# Patient Record
Sex: MALE | Race: WHITE | URBAN - METROPOLITAN AREA
[De-identification: names, ages, dates, MRNs, and addresses within clinical notes are randomized per-mention and may not be internally consistent; named-entity substitution may affect disease eponyms.]

---

## 2017-07-13 ENCOUNTER — TRANSFERRED RECORDS (OUTPATIENT)
Dept: HEALTH INFORMATION MANAGEMENT | Facility: CLINIC | Age: 27
End: 2017-07-13

## 2017-07-26 ENCOUNTER — TRANSFERRED RECORDS (OUTPATIENT)
Dept: HEALTH INFORMATION MANAGEMENT | Facility: CLINIC | Age: 27
End: 2017-07-26

## 2017-08-25 ENCOUNTER — TRANSFERRED RECORDS (OUTPATIENT)
Dept: HEALTH INFORMATION MANAGEMENT | Facility: CLINIC | Age: 27
End: 2017-08-25

## 2017-09-11 ENCOUNTER — CARE COORDINATION (OUTPATIENT)
Dept: GASTROENTEROLOGY | Facility: CLINIC | Age: 27
End: 2017-09-11

## 2017-09-11 NOTE — PROGRESS NOTES
[facility] Intake:     Referring/Requesting provider and Health care System: Irish Garcia [facility] GI     Clinic contact - Name Arnol Phone [phone] and Fax number: [phone]     Procedure Requested: EUS     Requested provided (if specified): Next available     Is this a procedure the provider does? (Look at procedure list):  Yes     Has patient been evaluated in clinic or had a procedure [facility] provider in the last 5 years:  No     What is the procedure to evaluate for/Reason/Indication of procedure: Duodenal mass vs abscess: wants 2nd opinion.     Is procedure to rule out malignancy or is there concern for underlying malignancy (if yes, send messages as High priority): Unsure     History and physical within last 30 days?  Yes     Imaging completed:     CT scan      Yes   MRI        No     Procedures:     Upper Endoscopy/EGD: Yes     Endoscopic Ultrasound/EUS: Yes -Upper EUS     ERCP X No     Colonoscopy X No     Are images able/being pushed to our system?  Yes       Was clinic informed to FEDEX/UPS Urgent referral imaging overnight?(requested within 1 week)   No     Is patient is aware of request for procedure and ok to be contacted to schedule?  Yes     Referral Received: 9/11/2017 9/11/2017: Film called to grab images, was told they do not have any images. Called Referring office: message left for Thad and advised we have not received images- pushed and we do not have any hard copy records either.   9/15/2017: received 16 pages of hard copy records including a cover page. Called referring provider to let them know we still do not have CT images, left voicemail advising need images.   9/25/20174: received CT images in PACS   Hard Copy chart created/ Records received: 9/15/2017    MD review date:                               Clinical History (per RN review of records provided):   GI Note from Irish Garcia NP 8/25/2017:  27 yr old with duodenal mass and recent EGD. Complaints of  epigastric discomfort and heartburn on occasion but not daily. Not on PPI due to the abscess that was present previously. Denies dysphagia, hematemesis, bowel changes, hematochezia or melena.     CT 7/13/2017  Impression:  - No evidence of appendicitis or diverticulitis.   - Wall thickening of the duodenum is however present and duodenitis cannot be ruled out.     EGD 7/26/2017  Assessment:  Ulcerated mass lesion in the bulb of duodenum, tunneled biopsies take for culture, regular histology, requested congo red strain and PAS stain; normal distal duodenum. Normal antrum, body of stomach, cardia and fundus. Noted barotrauma in fundus. The GE junction was visualized. Normal entire esophagus.       Recommendations/Orders:    9/28/2017: Hard copy records reviewed by Dr. Jernigan:  1. Clinic   Scheduled for Oct. 5th at 0900. Address given and phone number to call to reschedule or call to discuss with this RN.     Natalya ACOSTA RN Coordinator  Dr. Le, Dr. Cruz & Dr. Jernigan   [facility]  [phone]

## 2017-11-08 ENCOUNTER — TELEPHONE (OUTPATIENT)
Dept: GASTROENTEROLOGY | Facility: CLINIC | Age: 27
End: 2017-11-08

## 2017-11-08 NOTE — TELEPHONE ENCOUNTER
Spoke with patient reminded of upcoming appointment with Dr. Vincent on 11/16.  Number provided if needs to reschedule. He plans to attend.

## 2017-11-14 ASSESSMENT — ENCOUNTER SYMPTOMS
BOWEL INCONTINENCE: 0
ABDOMINAL PAIN: 1
VOMITING: 0
NAUSEA: 1
BLOOD IN STOOL: 0
CONSTIPATION: 0
RECTAL PAIN: 0
DIARRHEA: 0
JAUNDICE: 0
BLOATING: 1
HEARTBURN: 1

## 2017-11-16 ENCOUNTER — OFFICE VISIT (OUTPATIENT)
Dept: GASTROENTEROLOGY | Facility: CLINIC | Age: 27
End: 2017-11-16

## 2017-11-16 ENCOUNTER — OFFICE VISIT (OUTPATIENT)
Dept: SURGERY | Facility: CLINIC | Age: 27
End: 2017-11-16

## 2017-11-16 ENCOUNTER — ALLIED HEALTH/NURSE VISIT (OUTPATIENT)
Dept: SURGERY | Facility: CLINIC | Age: 27
End: 2017-11-16

## 2017-11-16 ENCOUNTER — ANESTHESIA EVENT (OUTPATIENT)
Dept: SURGERY | Facility: CLINIC | Age: 27
End: 2017-11-16

## 2017-11-16 VITALS
HEIGHT: 68 IN | OXYGEN SATURATION: 95 % | BODY MASS INDEX: 23.95 KG/M2 | TEMPERATURE: 97.6 F | SYSTOLIC BLOOD PRESSURE: 122 MMHG | WEIGHT: 158 LBS | DIASTOLIC BLOOD PRESSURE: 77 MMHG | HEART RATE: 64 BPM

## 2017-11-16 VITALS
BODY MASS INDEX: 23.96 KG/M2 | DIASTOLIC BLOOD PRESSURE: 77 MMHG | WEIGHT: 158.07 LBS | SYSTOLIC BLOOD PRESSURE: 122 MMHG | OXYGEN SATURATION: 95 % | TEMPERATURE: 97.6 F | RESPIRATION RATE: 18 BRPM | HEIGHT: 68 IN | HEART RATE: 64 BPM

## 2017-11-16 DIAGNOSIS — K31.89 DUODENAL MASS: Primary | ICD-10-CM

## 2017-11-16 DIAGNOSIS — K31.89 DUODENAL MASS: ICD-10-CM

## 2017-11-16 LAB
ALBUMIN SERPL-MCNC: 4.3 G/DL (ref 3.4–5)
ALP SERPL-CCNC: 43 U/L (ref 40–150)
ALT SERPL W P-5'-P-CCNC: 16 U/L (ref 0–70)
ANION GAP SERPL CALCULATED.3IONS-SCNC: 7 MMOL/L (ref 3–14)
AST SERPL W P-5'-P-CCNC: 17 U/L (ref 0–45)
BASOPHILS # BLD AUTO: 0 10E9/L (ref 0–0.2)
BASOPHILS NFR BLD AUTO: 0.5 %
BILIRUB SERPL-MCNC: 1.2 MG/DL (ref 0.2–1.3)
BUN SERPL-MCNC: 10 MG/DL (ref 7–30)
CALCIUM SERPL-MCNC: 9.2 MG/DL (ref 8.5–10.1)
CHLORIDE SERPL-SCNC: 104 MMOL/L (ref 94–109)
CO2 SERPL-SCNC: 29 MMOL/L (ref 20–32)
CREAT SERPL-MCNC: 0.74 MG/DL (ref 0.66–1.25)
DIFFERENTIAL METHOD BLD: ABNORMAL
EOSINOPHIL # BLD AUTO: 0.1 10E9/L (ref 0–0.7)
EOSINOPHIL NFR BLD AUTO: 3.7 %
ERYTHROCYTE [DISTWIDTH] IN BLOOD BY AUTOMATED COUNT: 11.8 % (ref 10–15)
GFR SERPL CREATININE-BSD FRML MDRD: >90 ML/MIN/1.7M2
GLUCOSE SERPL-MCNC: 90 MG/DL (ref 70–99)
HCT VFR BLD AUTO: 41 % (ref 40–53)
HGB BLD-MCNC: 13.9 G/DL (ref 13.3–17.7)
IMM GRANULOCYTES # BLD: 0 10E9/L (ref 0–0.4)
IMM GRANULOCYTES NFR BLD: 0 %
INR PPP: 0.99 (ref 0.86–1.14)
LYMPHOCYTES # BLD AUTO: 1.3 10E9/L (ref 0.8–5.3)
LYMPHOCYTES NFR BLD AUTO: 34.7 %
MCH RBC QN AUTO: 29.9 PG (ref 26.5–33)
MCHC RBC AUTO-ENTMCNC: 33.9 G/DL (ref 31.5–36.5)
MCV RBC AUTO: 88 FL (ref 78–100)
MONOCYTES # BLD AUTO: 0.4 10E9/L (ref 0–1.3)
MONOCYTES NFR BLD AUTO: 10.1 %
NEUTROPHILS # BLD AUTO: 1.9 10E9/L (ref 1.6–8.3)
NEUTROPHILS NFR BLD AUTO: 51 %
NRBC # BLD AUTO: 0 10*3/UL
NRBC BLD AUTO-RTO: 0 /100
PLATELET # BLD AUTO: 175 10E9/L (ref 150–450)
POTASSIUM SERPL-SCNC: 4.1 MMOL/L (ref 3.4–5.3)
PROT SERPL-MCNC: 7.7 G/DL (ref 6.8–8.8)
RBC # BLD AUTO: 4.65 10E12/L (ref 4.4–5.9)
SODIUM SERPL-SCNC: 140 MMOL/L (ref 133–144)
WBC # BLD AUTO: 3.8 10E9/L (ref 4–11)

## 2017-11-16 ASSESSMENT — PAIN SCALES - GENERAL: PAINLEVEL: NO PAIN (0)

## 2017-11-16 NOTE — H&P
"  Pre-Operative H & P     CC:  Preoperative exam to assess for increased cardiopulmonary risk while undergoing surgery and anesthesia.    Date of Encounter: 11/16/2017  Primary Care Physician:  No Ref-Primary, Physician    HPI  Gordo Bearden is a 27 year old male who presents for pre-operative H & P in preparation for EGD & EUS with Dr. Jernigan on 12/12/2017 at [facility].     Gordo is a 27yr male here for a second opinion in regards to his duodenal mass and recent repeat EGD. He reports for the past several years daily feeling \"bloated\" at various levels of intensity. Approximately once yearly he has significant symptoms of nauseas and vomiting with abdominal pain that can last several days. He manages some of his symptoms with diet. The mass biopsy results: prominent eosinophils; negative for dysplasia or malignancy. The patient was placed  antibiotics for treatment for which the mass became smaller in size however, once he stopped antibiotics it returned back to the previous size. He has met with Dr. Jernigan and the above procedure has been scheduled.  Otherwise oGrdo is a health young male with no other significant health history.       History is obtained from the patient.       Past Medical History  Past Medical History:   Diagnosis Date     Duodenal mass 2015     Duodenal ulcer 2015       Past Surgical History  Past Surgical History:   Procedure Laterality Date     UPPER GI ENDOSCOPY         Hx of Blood transfusions/reactions: no     Hx of abnormal bleeding or anti-platelet use: no    Menstrual history: No LMP for male patient.:     Steroid use in the last year: no    Personal or FH with difficulty with Anesthesia:  no    Prior to Admission Medications  No current outpatient prescriptions on file.       Allergies  Allergies   Allergen Reactions     Bees        Social History  Social History     Social History     Marital status:      Spouse name: N/A " "    Number of children: N/A     Years of education: N/A     Occupational History     Not on file.     Social History Main Topics     Smoking status: Never Smoker     Smokeless tobacco: Never Used     Alcohol use Yes      Comment: casual     Drug use: No     Sexual activity: Not on file     Other Topics Concern     Not on file     Social History Narrative       Family History  Family History   Problem Relation Age of Onset     Hypertension Mother      Leukemia Brother 3     HEART DISEASE Maternal Grandfather        ROS/MED HX    ENT/Pulmonary:  - neg pulmonary ROS     Neurologic:  - neg neurologic ROS     Cardiovascular:  - neg cardiovascular ROS   (+) ----. : . . . :. . No previous cardiac testing       METS/Exercise Tolerance:  >4 METS   Hematologic:  - neg hematologic  ROS       Musculoskeletal:  - neg musculoskeletal ROS       GI/Hepatic: Comment: Controls with diet    (+) GERD Other, Other GI/Hepatic Duodenal mass      Renal/Genitourinary:  - ROS Renal section negative       Endo:  - neg endo ROS       Psychiatric:  - neg psychiatric ROS       Infectious Disease:  - neg infectious disease ROS       Malignancy:      - no malignancy   Other:         Temp: 97.6  F (36.4  C) Temp src: Oral BP: 122/77 Pulse: 64   Resp: 18 SpO2: 95 %         158 lbs 1.12 oz  5' 8.25\"   Body mass index is 23.86 kg/(m^2).       Physical Exam  Constitutional: Awake, alert, cooperative, no apparent distress, and appears stated age.  Eyes: Pupils equal, round and reactive to light, extra ocular muscles intact, sclera clear, conjunctiva normal.  HENT: Normocephalic, oral pharynx with moist mucus membranes, good dentition. No goiter appreciated.   Respiratory: Clear to auscultation bilaterally, no crackles or wheezing.  Cardiovascular: Regular rate and rhythm, normal S1 and S2, and no murmur noted.  Carotids +2, no bruits. No edema. Palpable pulses to radial  DP and PT arteries.   GI: Normal bowel sounds, soft, non-distended, non-tender, no " masses palpated, no hepatosplenomegaly.    Lymph/Hematologic: No cervical lymphadenopathy and no supraclavicular lymphadenopathy.  Genitourinary:  deferred  Skin: Warm and dry.  No rashes at anticipated surgical site.   Musculoskeletal: Full ROM of neck. There is no redness, warmth, or swelling of the joints. Gross motor strength is normal.    Neurologic: Awake, alert, oriented to name, place and time. Cranial nerves II-XII are grossly intact. Gait is normal.   Neuropsychiatric: Calm, cooperative. Normal affect.     EGD: 7/26/17  Findings: Ulcerated mass lesion in bulb of duodenum.    Outside records reviewed from: [facility] everywhere    ASSESSMENT and PLAN  Gordo Bearden is scheduled for EGD and EUS on 12/12/2017 by Dr. Jernigan in management of duodenal mass. PAC referral for risk assessment and optimization for anesthesia with comorbid conditions of :  Pre-operative considerations:  1. Cardiac: Functional status- METS >4.  Low risk surgery with 0.4% (RCRI #) risk of major adverse cardiac event. Patient has no cardiac history or reported symptoms  2. Pulm: Airway feasible. LILY risk: low. No pulmonary history reported  3. GI: Risk of PONV score = 1. If > 2, anti-emetic intervention recommended.      Duodenal mass:   4. Musc: VTE risk:0.5%    Patient was discussed with Dr Olea.    EUSEBIO Lo CNP  Preoperative Assessment Center  [facility]  Clinic and Surgery Center  Phone: [phone]  Fax: [phone]

## 2017-11-16 NOTE — PROGRESS NOTES
REFERRING PHYSICIAN:  Izaiah Stover MD, from [place], [place].      CHIEF COMPLAINT:  Abdominal discomfort.      HISTORY OF PRESENT ILLNESS:    This is a very pleasant 27-year-old white male from [place] who is accompanied by his wife who is being evaluated for nausea, vomiting and abdominal pain.  The patient reports his history started in 07/2015.  He went to his primary care doctor with nausea, vomiting and significant epigastric abdominal pain which he was thought to have gastritis or ulcers and started Zantac and Prilosec.  His symptoms partially resolved, and he had stopped taking Prilosec and Zantac at that point.  His symptoms, however, recurred the following year, 07/2016, and he ended up going back to [facility].  At this point an upper endoscopy and a CT scan were performed.  Upper endoscopy was performed in 11/03/2016.  It showed that there was duodenitis with raised area secondary to swelling from inflammation in the bulb.  This was biopsied, and he was discharged but the symptoms did not improve.  He underwent a CT scan.  The CT scan showed a hypoenhancing oval-shaped lesion within the proximal duodenum just distal to the gastric pylorus which arose in the submucosal space.  At that point, it was thought to be either a neoplastic process versus an abscess, and an upper endoscopy was again repeated.  Nodular erythematous mucosa in the duodenal bulb was again noticed, and he also subsequently underwent an endoscopic ultrasound.  The endoscopic ultrasound showed a poorly defined hypoechoic cystic mass involving the submucosa of the duodenal bulb and prepyloric antrum measuring 2.5 x 1.7 cm.  The cystic portion appeared to have several cysts channels, the largest measuring 15.9 x 5.5 mm.  A 22-gauge Sharkcore needle was used for fine needle biopsy, and tunnel forceps biopsy was performed.  The pathology from the fine needle aspiration showed only inflammatory cells, and it was  thought to be more like an inflammatory versus abscess process.  He was started on antibiotics such as ciprofloxacin and was discharged.  Again, the patient felt better briefly, but his symptoms started again this year.  He underwent another CAT scan in January which showed focal mural thickening of the gastric antrum and the proximal duodenum, and he underwent a CT scan in July which again shows a focal wall thickening of the duodenum.  He subsequently underwent an upper endoscopy in 07/2017 which shows an ulcerated mass in the bulb of the duodenum.  Tunnel biopsies were taken.  This time he was tested for amyloidosis and Whipple disease and was noted to be negative for both.  He was subsequently referred here.  The patient has been doing progressively better.  He has mild abdominal discomfort, but no pain, nausea or vomiting currently.  He has regular bowel movements, 1 bowel movement a day.  He has no melena, hematochezia or hematemesis.  He denies dysphagia or odynophagia.  Denies fevers or chills, has not lost weight.  He did lose weight the last year, about 10-15 pounds, and has gained this weight back.      ALLERGIES:  He has allergies to bees.      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      PAST SURGICAL HISTORY:  EGDs and EUS.      SOCIAL HISTORY:  He is  for the past 5 years, has a 4-year-old kid.  He denies smoking.  He drinks socially, about 2 drinks per day.  Denies IV drug abuse or any street drugs.  Works at office job.      MEDICATIONS:  None currently.      FAMILY HISTORY:  No history of stomach cancer or duodenal cancer.  No history of pancreatic or biliary cancers.      PHYSICAL EXAMINATION:   VITAL SIGNS:  Stable.   GENERAL:  He is not in acute distress.   HEAD AND NECK:  No pallor, no icterus.   CHEST:  Lungs are clear to auscultation.   CARDIOVASCULAR:  S1, S2 heard, rate and rhythm are regular.   ABDOMEN:  Soft, nontender, nondistended.  Bowel sounds are heard.    CENTRAL NERVOUS SYSTEM:  Alert, awake, oriented to time, place and person.   EXTREMITIES:  No pedal edema.      ASSESSMENT AND PLAN:  This is a 27-year-old gentleman who has an intriguing constellation of symptoms including nausea, vomiting and abdominal pain every July or October every year, and he is status post 3 EGDs with the most recent EGD showing an ulcerated nodular mass in the duodenum, status post EUS which showed a 2.5 cm cystic lesion, status post FNB which showed inflammatory cells and Whipplw disease and amyloidosis has already been excluded.  Currently, he is minimally symptomatic.  Differentials include benign disease process such as an ulcers versus gastric ulcerated GIST.        Our recommendations are as follows:     1.  We will repeat CT scan of the abdomen with IV contrast today to evaluate the duodenal wall thickening.   2.  We will obtain preop clearance for endoscopic ultrasound.   3.  We will repeat an upper endoscopy and endoscopic ultrasound.  This has been tentatively scheduled on 12/12/2017, as patient is going to come to [place] for a vacation during that time.  During this process, we will repeat biopsies of the duodenal lesion and we will also an endoscopic ultrasound with fine needle biopsy to evaluate for GIST or degenerated leiomyosarcoma and others per pathology.     4.  We also explained to the patient that his ulcer could have been completely healed by now; but regardless, since the symptoms have been recurrent, we will proceed with this upper endoscopy and endoscopic ultrasound.  We also explained to the patient that there may be another unlikely causes such as Zollinger-Bobby syndrome which could produce gastrin and cause bad, nonhealing ulcers, and during the EUS, we would be looking for small tumors in the pancreas as well.      A total of 45 minutes were spent with more than 50% of the time in counseling and formulating a plan.  The patient and his wife verbalized  their understanding including the risk of endoscopic ultrasound under MAC anesthesia.  No further clinical followup until the procedure.

## 2017-11-16 NOTE — NURSING NOTE
Reviewed today's consult and answered patient's questions.  Patient verbalized understanding and agreed to call me with any questions / concerns in the future and confirmed having our contact information.  Please see patient instructions below. \

## 2017-11-16 NOTE — MR AVS SNAPSHOT
After Visit Summary   2017    Gordo Bearden    MRN: [id number]           Patient Information     Date Of Birth          1990        Visit Information        Provider Department      2017 11:30 AM Rn, [facility] Preoperative Assessment Center        Care Instructions    Preparing for Your Surgery      Name:  Gordo Bearden   MRN:  [id number]   :  1990   Today's Date:  2017     Arriving for surgery:   PAC CLINIC WILL CONTACT YOU 1-2 DAYS BEFORE PROCEDURE TO REVIEW INSTRUCTIONS  [phone]  Surgery date:    Arrival time:    Please come to:           What can I eat or drink?  -  You may have solid food or milk products until 8 hours prior to your surgery.  -  You may have water, apple juice or 7up/Sprite until 2 hours prior to your surgery.    Which medicines can I take?  -  Do NOT take Ibuprofen for 24 hours before surgery.  -  Please take these medications the day of surgery: none  -  Tylenol is OK if needed    How do I prepare myself?  -  Take two showers: one the night before surgery; and one the morning of surgery.         Use Scrubcare or Hibiclens to wash from neck down.  You may use your own shampoo and conditioner. No other hair products.   -  Do NOT use lotion, powder, deodorant, or antiperspirant the day of your surgery.  -  Do NOT wear any makeup, fingernail polish or jewelry.  -  Do not bring your own medications to the hospital, except for inhalers and eye drops.  -  Bring your ID and insurance card.    Questions or Concerns:  If you have questions or concerns, please call the  Preoperative Assessment Center, Monday-Friday 7AM-7PM:  [phone]              AFTER YOUR SURGERY  Breathing exercises   Breathing exercises help you recover faster. Take deep breaths and let the air out slowly. This will:     Help you wake up after surgery.    Help prevent complications like pneumonia.  Preventing complications will help you go home sooner.   We may give you a  breathing device (incentive spirometer) to encourage you to breathe deeply.   Nausea and vomiting   You may feel sick to your stomach after surgery; if so, let your nurse know.    Pain control:  After surgery, you may have pain. Our goal is to help you manage your pain. Pain medicine will help you feel comfortable enough to do activities that will help you heal.  These activities may include breathing exercises, walking and physical therapy.   To help your health care team treat your pain we will ask: 1) If you have pain  2) where it is located 3) describe your pain in your words  Methods of pain control include medications given by mouth, vein or by nerve block for some surgeries.  We may give you a pain control pump that will:  1) Deliver the medicine through a tube placed in your vein  2) Control the amount of medicine you receive  3) Allow you to push a button to deliver a dose of pain medicine  Sequential Compression Device (SCD) or Pneumo Boots:  You may need to wear SCD S on your legs or feet. These are wraps connected to a machine that pumps in air and releases it. The repeated pumping helps prevent blood clots from forming.           Follow-ups after your visit        Your next 10 appointments already scheduled     Nov 16, 2017 11:30 AM CST   (Arrive by 11:15 AM)   PAC RN ASSESSMENT with  Pac Rn   [facility] Preoperative Assessment Center ([facility])    [address] [place] [id number]   [phone]            Nov 16, 2017 11:50 AM CST   (Arrive by 11:35 AM)   PAC Anesthesia Consult with  Pac Anesthesiologist   [facility] Preoperative Assessment Center ([facility])    [address] [place] [id number]   [phone]            Nov 17, 2017  7:00 AM CST   (Arrive by 6:45 AM)   CT ABDOMEN PELVIS W CONTRAST with UCCT1   [facility] [place] [facility] ([facility])    [address] [place] [address]  Se  1st Floor  [facility] [id number] [phone]           Please bring any scans or X-rays taken at other hospitals, if similar tests were done. Also bring a list of your medicines, including vitamins, minerals and over-the-counter drugs. It is safest to leave personal items at home.  Be sure to tell your doctor:   If you have any allergies.   If there s any chance you are pregnant.   If you are breastfeeding.   If you have any special needs.  You may have contrast for this exam. To prepare:   Do not eat or drink for 2 hours before your exam. If you need to take medicine, you may take it with small sips of water. (We may ask you to take liquid medicine as well.)   The day before your exam, drink extra fluids at least six 8-ounce glasses (unless your doctor tells you to restrict your fluids).  Patients over 70 or patients with diabetes or kidney problems:   If you haven t had a blood test (creatinine test) within the last 30 days, go to your clinic or Diagnostic Imaging Department for this test.  If you have diabetes:   If your kidney function is normal, continue taking your metformin (Avandamet, Glucophage, Glucovance, Metaglip) on the day of your exam.   If your kidney function is abnormal, wait 48 hours before restarting this medicine.  You will have oral contrast for this exam:   You will drink the contrast at home. Get this from your clinic or Diagnostic Imaging Department. Please follow the directions given.  Please wear loose clothing, such as a sweat suit or jogging clothes. Avoid snaps, zippers and other metal. We may ask you to undress and put on a hospital gown.  If you have any questions, please call the Imaging Department where you will have your exam.              Future tests that were ordered for you today     Open Future Orders        Priority Expected Expires Ordered    EXAMEGD Routine 12/3/2017 2/14/2018 11/16/2017    CT Abdomen pelvis w contrast* Routine  11/16/2018 11/16/2017             Who to contact     Please call your clinic at [phone] to:    Ask questions about your health    Make or cancel appointments    Discuss your medicines    Learn about your test results    Speak to your doctor   If you have compliments or concerns about an experience at your clinic, or if you wish to file a complaint, please contact [facility] Patient Relations at [phone] or email us at [email or web address]         Additional Information About Your Visit        Tranzlogich[employer] Information     [employer] gives you secure access to your electronic health record. If you see a primary care provider, you can also send messages to your care team and make appointments. If you have questions, please call your primary care clinic.  If you do not have a primary care provider, please call [phone] and they will assist you.      Pictela is an electronic gateway that provides easy, online access to your medical records. With Pictela, you can request a clinic appointment, read your test results, renew a prescription or communicate with your care team.     To access your existing account, please contact your [facility] Clinic or call [phone] for assistance.        Care EveryWhere ID     This is your Care EveryWhere ID. This could be used by other organizations to access your [facility] medical records  [id number]         Blood Pressure from Last 3 Encounters:   11/16/17 122/77   11/16/17 122/77    Weight from Last 3 Encounters:   11/16/17 71.7 kg (158 lb 1.1 oz)   11/16/17 71.7 kg (158 lb)              Today, you had the following     No orders found for display       Primary Care Provider    Physician No Ref-Primary       NO REF-PRIMARY PHYSICIAN        Equal Access to Services     MEGAN AGUILAR : Hadii steph Manning, wajennyda luderek, qamalloryta silvia holder . So [facility] [phone].    ATENCIÓN: Darion patel  español, tiene a gill disposición servicios gratuitos de asistencia lingüística. Olga garcia [phone].    We comply with applicable federal civil rights laws and [place] laws. We do not discriminate on the basis of race, color, national origin, age, disability, sex, sexual orientation, or gender identity.            Thank you!     Thank you for choosing [facility] PREOPERATIVE ASSESSMENT CENTER  for your care. Our goal is always to provide you with excellent care. Hearing back from our patients is one way we can continue to improve our services. Please take a few minutes to complete the written survey that you may receive in the mail after your visit with us. Thank you!             Your Updated Medication List - Protect others around you: Learn how to safely use, store and throw away your medicines at www.disposemymeds.org.      Notice  As of 11/16/2017 11:04 AM    You have not been prescribed any medications.

## 2017-11-16 NOTE — LETTER
11/16/2017     RE: Gordo Bearden  [address] [place] [address]     Dear Colleague,    Thank you for referring your patient, Gordo Bearden, to the [facility] PANCREAS AND BILIARY at [facility]. Please see a copy of my visit note below.    REFERRING PHYSICIAN:  Izaiah Stover MD, from [place], [place].      CHIEF COMPLAINT:  Abdominal discomfort.      HISTORY OF PRESENT ILLNESS:    This is a very pleasant 27-year-old white male from [place] who is accompanied by his wife who is being evaluated for nausea, vomiting and abdominal pain.  The patient reports his history started in 07/2015.  He went to his primary care doctor with nausea, vomiting and significant epigastric abdominal pain which he was thought to have gastritis or ulcers and started Zantac and Prilosec.  His symptoms partially resolved, and he had stopped taking Prilosec and Zantac at that point.  His symptoms, however, recurred the following year, 07/2016, and he ended up going back to [facility].  At this point an upper endoscopy and a CT scan were performed.  Upper endoscopy was performed in 11/03/2016.  It showed that there was duodenitis with raised area secondary to swelling from inflammation in the bulb.  This was biopsied, and he was discharged but the symptoms did not improve.  He underwent a CT scan.  The CT scan showed a hypoenhancing oval-shaped lesion within the proximal duodenum just distal to the gastric pylorus which arose in the submucosal space.  At that point, it was thought to be either a neoplastic process versus an abscess, and an upper endoscopy was again repeated.  Nodular erythematous mucosa in the duodenal bulb was again noticed, and he also subsequently underwent an endoscopic ultrasound.  The endoscopic ultrasound showed a poorly defined hypoechoic cystic mass involving the submucosa of the duodenal bulb and prepyloric antrum measuring 2.5 x 1.7 cm.  The cystic  portion appeared to have several cysts channels, the largest measuring 15.9 x 5.5 mm.  A 22-gauge Sharkcore needle was used for fine needle biopsy, and tunnel forceps biopsy was performed.  The pathology from the fine needle aspiration showed only inflammatory cells, and it was thought to be more like an inflammatory versus abscess process.  He was started on antibiotics such as ciprofloxacin and was discharged.  Again, the patient felt better briefly, but his symptoms started again this year.  He underwent another CAT scan in January which showed focal mural thickening of the gastric antrum and the proximal duodenum, and he underwent a CT scan in July which again shows a focal wall thickening of the duodenum.  He subsequently underwent an upper endoscopy in 07/2017 which shows an ulcerated mass in the bulb of the duodenum.  Tunnel biopsies were taken.  This time he was tested for amyloidosis and Whipple disease and was noted to be negative for both.  He was subsequently referred here.  The patient has been doing progressively better.  He has mild abdominal discomfort, but no pain, nausea or vomiting currently.  He has regular bowel movements, 1 bowel movement a day.  He has no melena, hematochezia or hematemesis.  He denies dysphagia or odynophagia.  Denies fevers or chills, has not lost weight.  He did lose weight the last year, about 10-15 pounds, and has gained this weight back.      ALLERGIES:  He has allergies to bees.      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      PAST SURGICAL HISTORY:  EGDs and EUS.      SOCIAL HISTORY:  He is  for the past 5 years, has a 4-year-old kid.  He denies smoking.  He drinks socially, about 2 drinks per day.  Denies IV drug abuse or any street drugs.  Works at office job.      MEDICATIONS:  None currently.      FAMILY HISTORY:  No history of stomach cancer or duodenal cancer.  No history of pancreatic or biliary cancers.      PHYSICAL EXAMINATION:   VITAL  SIGNS:  Stable.   GENERAL:  He is not in acute distress.   HEAD AND NECK:  No pallor, no icterus.   CHEST:  Lungs are clear to auscultation.   CARDIOVASCULAR:  S1, S2 heard, rate and rhythm are regular.   ABDOMEN:  Soft, nontender, nondistended.  Bowel sounds are heard.   CENTRAL NERVOUS SYSTEM:  Alert, awake, oriented to time, place and person.   EXTREMITIES:  No pedal edema.      ASSESSMENT AND PLAN:  This is a 27-year-old gentleman who has an intriguing constellation of symptoms including nausea, vomiting and abdominal pain every July or October every year, and he is status post 3 EGDs with the most recent EGD showing an ulcerated nodular mass in the duodenum, status post EUS which showed a 2.5 cm cystic lesion, status post FNB which showed inflammatory cells and Whipplw disease and amyloidosis has already been excluded.  Currently, he is minimally symptomatic.  Differentials include benign disease process such as an ulcers versus gastric ulcerated GIST.        Our recommendations are as follows:     1.  We will repeat CT scan of the abdomen with IV contrast today to evaluate the duodenal wall thickening.   2.  We will obtain preop clearance for endoscopic ultrasound.   3.  We will repeat an upper endoscopy and endoscopic ultrasound.  This has been tentatively scheduled on 12/12/2017, as patient is going to come to [place] for a vacation during that time.  During this process, we will repeat biopsies of the duodenal lesion and we will also an endoscopic ultrasound with fine needle biopsy to evaluate for GIST or degenerated leiomyosarcoma and others per pathology.     4.  We also explained to the patient that his ulcer could have been completely healed by now; but regardless, since the symptoms have been recurrent, we will proceed with this upper endoscopy and endoscopic ultrasound.  We also explained to the patient that there may be another unlikely causes such as Zollinger-Bobby syndrome which could  produce gastrin and cause bad, nonhealing ulcers, and during the EUS, we would be looking for small tumors in the pancreas as well.      A total of 45 minutes were spent with more than 50% of the time in counseling and formulating a plan.  The patient and his wife verbalized their understanding including the risk of endoscopic ultrasound under MAC anesthesia.  No further clinical followup until the procedure.     Again, thank you for allowing me to participate in the care of your patient.      Sincerely,    Guru Rere MD

## 2017-11-16 NOTE — MR AVS SNAPSHOT
After Visit Summary   11/16/2017    Gordo Bearden    MRN: [id number]           Patient Information     Date Of Birth          1990        Visit Information        Provider Department      11/16/2017 9:00 AM Guru Neil Jernigan MD [facility] Pancreas and Biliary        Today's Diagnoses     Duodenal mass    -  1      Care Instructions    1.  CT scan scheduled for 7:00 AM first floor.    2.  Pre-op appointment scheduled for 10:30 AM fourth floor.    3.  Lab work - first floor.     4.  EGD and EUS tentatively scheduled for 12/12/2017, you will receive a call from the scheduling department.        Follow up:    Please call with any questions or concerns regarding your clinic visit today.    It is a pleasure being involved in your health care.    Contacts post-consultation depending on your need:    Schedule Clinic Appointments       [phone] # 1   M-F 7:30 - 5 pm    Natalya ACOSTA RN Coordinator           [phone] # 3   M-F 8:00 - 5 pm  (Triage hours)     Juni MARCELINO RN Coordinator               [phone] # 3   M-F 8:00 - 5 pm  (Triage hours)    OR Procedure Scheduling              [phone]    My Chart is available 24 hours a day and is a secure way to access your records and communicate with your care team.  I strongly recommend signing up if you haven't already done so, if you are comfortable with computers.  If you would like to inquire about this or are having problems with My Chart access, you may call [phone] or go online at [email or web address].  Please allow at least 24 hours for a response and extra time on weekends and Holidays.          Follow-ups after your visit        Your next 10 appointments already scheduled     Nov 16, 2017 10:30 AM CST   (Arrive by 10:15 AM)   PAC EVALUATION with  Pac Matt [address] [place] [address] Preoperative Assessment Center ([facility])    [address]  4th Floor  [facility] [address] [phone]             Nov 16, 2017 11:30 AM CST   (Arrive by 11:15 AM)   PAC RN ASSESSMENT with  Pac Rn   [facility] Preoperative Assessment Center ([facility])    [address]  4th Floor  [facility] [address] [phone]            Nov 16, 2017 11:50 AM CST   (Arrive by 11:35 AM)   PAC Anesthesia Consult with  Pac Anesthesiologist   [facility] Preoperative Assessment Center ([facility])    [address]  4th Floor  [facility] [id number] [phone]            Nov 17, 2017  7:00 AM CST   (Arrive by 6:45 AM)   CT ABDOMEN PELVIS W CONTRAST with UCCT1   [facility] Imaging [place] [facility] ([facility])    [address]  1st Floor  [facility] [id number] [phone]           Please bring any scans or X-rays taken at other hospitals, if similar tests were done. Also bring a list of your medicines, including vitamins, minerals and over-the-counter drugs. It is safest to leave personal items at home.  Be sure to tell your doctor:   If you have any allergies.   If there s any chance you are pregnant.   If you are breastfeeding.   If you have any special needs.  You may have contrast for this exam. To prepare:   Do not eat or drink for 2 hours before your exam. If you need to take medicine, you may take it with small sips of water. (We may ask you to take liquid medicine as well.)   The day before your exam, drink extra fluids at least six 8-ounce glasses (unless your doctor tells you to restrict your fluids).  Patients over 70 or patients with diabetes or kidney problems:   If you haven t had a blood test (creatinine test) within the last 30 days, go to your clinic or Diagnostic Imaging Department for this test.  If you have diabetes:   If your kidney function is normal, continue taking your metformin (Avandamet, Glucophage, Glucovance, Metaglip) on the day of your exam.   If your kidney function is abnormal, wait 48 hours  before restarting this medicine.  You will have oral contrast for this exam:   You will drink the contrast at home. Get this from your clinic or Diagnostic Imaging Department. Please follow the directions given.  Please wear loose clothing, such as a sweat suit or jogging clothes. Avoid snaps, zippers and other metal. We may ask you to undress and put on a hospital gown.  If you have any questions, please call the Imaging Department where you will have your exam.              Future tests that were ordered for you today     Open Future Orders        Priority Expected Expires Ordered    CBC with platelets differential Routine  11/16/2018 11/16/2017    INR Routine  11/16/2018 11/16/2017    Comprehensive metabolic panel Routine  11/16/2018 11/16/2017    CT Abdomen pelvis w contrast* Routine  11/16/2018 11/16/2017            Who to contact     Please call your clinic at [phone] to:    Ask questions about your health    Make or cancel appointments    Discuss your medicines    Learn about your test results    Speak to your doctor   If you have compliments or concerns about an experience at your clinic, or if you wish to file a complaint, please contact [facility] Patient Relations at [phone] or email us at [email or web address]         Additional Information About Your Visit        Targeter App Information     Targeter App gives you secure access to your electronic health record. If you see a primary care provider, you can also send messages to your care team and make appointments. If you have questions, please call your primary care clinic.  If you do not have a primary care provider, please call [phone] and they will assist you.      Targeter App is an electronic gateway that provides easy, online access to your medical records. With Targeter App, you can request a clinic appointment, read your test results, renew a prescription or communicate with your care team.     To access your  "existing account, please contact your [facility] or call [phone] for assistance.        Care EveryWhere ID     This is your Care EveryWhere ID. This could be used by other organizations to access your [place] medical records  [id number]        Your Vitals Were     Pulse Temperature Height Pulse Oximetry BMI (Body Mass Index)       64 97.6  F (36.4  C) (Oral) 1.734 m (5' 8.25\") 95% 23.85 kg/m2        Blood Pressure from Last 3 Encounters:   11/16/17 122/77    Weight from Last 3 Encounters:   11/16/17 71.7 kg (158 lb)               Primary Care Provider    Physician No Ref-Primary       NO REF-PRIMARY PHYSICIAN        Equal Access to Services     MEGAN AGUILAR : Isra Manning, wajennyda ashleighadaha, qaybta kaalmada kyleyada, silvia melgar . So [facility] [phone].    ATENCIÓN: Si habla español, tiene a gill disposición servicios gratuitos de asistencia lingüística. Llame al [phone].    We comply with applicable federal civil rights laws and [place] laws. We do not discriminate on the basis of race, color, national origin, age, disability, sex, sexual orientation, or gender identity.            Thank you!     Thank you for choosing [facility] PANCREAS AND BILIARY  for your care. Our goal is always to provide you with excellent care. Hearing back from our patients is one way we can continue to improve our services. Please take a few minutes to complete the written survey that you may receive in the mail after your visit with us. Thank you!             Your Updated Medication List - Protect others around you: Learn how to safely use, store and throw away your medicines at www.disposemymeds.org.      Notice  As of 11/16/2017  9:27 AM    You have not been prescribed any medications.      "

## 2017-11-16 NOTE — NURSING NOTE
"Chief Complaint   Patient presents with     Clinic Care Coordination - Initial     New patient consult.        Vitals:    11/16/17 0847   BP: 122/77   BP Location: Left arm   Patient Position: Chair   Cuff Size: Adult Regular   Pulse: 64   Temp: 97.6  F (36.4  C)   TempSrc: Oral   SpO2: 95%   Weight: 158 lb   Height: 5' 8.25\"       Body mass index is 23.85 kg/(m^2).    Diana DOAN LPN                        "

## 2017-11-16 NOTE — PATIENT INSTRUCTIONS
Preparing for Your Surgery      Name:  Gordo Bearden   MRN:  [id number]   :  1990   Today's Date:  2017     Arriving for surgery:   PAC CLINIC WILL CONTACT YOU 1-2 DAYS BEFORE PROCEDURE TO REVIEW INSTRUCTIONS  [phone]  Surgery date:    Arrival time:    Please come to:           What can I eat or drink?  -  You may have solid food or milk products until 8 hours prior to your surgery.  -  You may have water, apple juice or 7up/Sprite until 2 hours prior to your surgery.    Which medicines can I take?  -  Do NOT take Ibuprofen for 24 hours before surgery.  -  Please take these medications the day of surgery: none  -  Tylenol is OK if needed    How do I prepare myself?  -  Take two showers: one the night before surgery; and one the morning of surgery.         Use Scrubcare or Hibiclens to wash from neck down.  You may use your own shampoo and conditioner. No other hair products.   -  Do NOT use lotion, powder, deodorant, or antiperspirant the day of your surgery.  -  Do NOT wear any makeup, fingernail polish or jewelry.  -  Do not bring your own medications to the hospital, except for inhalers and eye drops.  -  Bring your ID and insurance card.    Questions or Concerns:  If you have questions or concerns, please call the  Preoperative Assessment Center, Monday-Friday 7AM-7PM:  [phone]              AFTER YOUR SURGERY  Breathing exercises   Breathing exercises help you recover faster. Take deep breaths and let the air out slowly. This will:     Help you wake up after surgery.    Help prevent complications like pneumonia.  Preventing complications will help you go home sooner.   We may give you a breathing device (incentive spirometer) to encourage you to breathe deeply.   Nausea and vomiting   You may feel sick to your stomach after surgery; if so, let your nurse know.    Pain control:  After surgery, you may have pain. Our goal is to help you manage your pain. Pain medicine will help you feel  comfortable enough to do activities that will help you heal.  These activities may include breathing exercises, walking and physical therapy.   To help your health care team treat your pain we will ask: 1) If you have pain  2) where it is located 3) describe your pain in your words  Methods of pain control include medications given by mouth, vein or by nerve block for some surgeries.  We may give you a pain control pump that will:  1) Deliver the medicine through a tube placed in your vein  2) Control the amount of medicine you receive  3) Allow you to push a button to deliver a dose of pain medicine  Sequential Compression Device (SCD) or Pneumo Boots:  You may need to wear SCD S on your legs or feet. These are wraps connected to a machine that pumps in air and releases it. The repeated pumping helps prevent blood clots from forming.

## 2017-11-16 NOTE — PATIENT INSTRUCTIONS
1.  CT scan scheduled for 7:00 AM Friday morning 11/17/2017, please check in first floor.    2.  Pre-op appointment scheduled for 10:30 AM 11/16/2017,please check in fourth floor.    3.  Lab work - please complete today or tomorrow, please check in first floor.     4.  EGD and EUS tentatively scheduled for 12/12/2017, you will receive a call from the scheduling department to confirm date and time.      Follow up:  As above.    Please call with any questions or concerns regarding your clinic visit today.    It is a pleasure being involved in your health care.    Contacts post-consultation depending on your need:    Schedule Clinic Appointments       [phone] # 1   M-F 7:30 - 5 pm    Natalya ACOSTA RN Coordinator           [phone] # 3   M-F 8:00 - 5 pm  (Triage hours)     Juni MARCELINO RN Coordinator               [phone] # 3   M-F 8:00 - 5 pm  (Triage hours)    OR Procedure Scheduling              [phone]    My Chart is available 24 hours a day and is a secure way to access your records and communicate with your care team.  I strongly recommend signing up if you haven't already done so, if you are comfortable with computers.  If you would like to inquire about this or are having problems with My Chart access, you may call [phone] or go online at [email or web address].  Please allow at least 24 hours for a response and extra time on weekends and Holidays.

## 2017-11-16 NOTE — ANESTHESIA PREPROCEDURE EVALUATION
Anesthesia Evaluation     . Pt has had prior anesthetic. Type: General and MAC    No history of anesthetic complications          ROS/MED HX    ENT/Pulmonary:  - neg pulmonary ROS     Neurologic:  - neg neurologic ROS     Cardiovascular:  - neg cardiovascular ROS   (+) ----. : . . . :. . No previous cardiac testing       METS/Exercise Tolerance:  >4 METS   Hematologic:  - neg hematologic  ROS       Musculoskeletal:  - neg musculoskeletal ROS       GI/Hepatic: Comment: Controls with diet    (+) GERD Other, Other GI/Hepatic Duodenal mass      Renal/Genitourinary:  - ROS Renal section negative       Endo:  - neg endo ROS       Psychiatric:  - neg psychiatric ROS       Infectious Disease:  - neg infectious disease ROS       Malignancy:      - no malignancy   Other:                     Physical Exam  Normal systems: cardiovascular, pulmonary and dental    Airway   Mallampati: I  TM distance: >3 FB  Neck ROM: full    Dental     Cardiovascular   Rhythm and rate: regular and normal      Pulmonary                PAC Discussion and Assessment    ASA Classification: 1  Case is suitable for: [place]  Anesthetic techniques and relevant risks discussed: GA  Invasive monitoring and risk discussed: No  Types:   Possibility and Risk of blood transfusion discussed: No  NPO instructions given:   Additional anesthetic preparation and risks discussed:   Needs early admission to pre-op area:   Other:     PAC Resident/NP Anesthesia Assessment:  Gordo Bearden is scheduled for EGD and EUS on 12/12/2017 by Dr. Jernigan in treatment of duodenal mass. PAC referral for risk assessment and optimization for anesthesia with comorbid conditions of :  Pre-operative considerations:  1. Cardiac: Functional status- METS >4.  Low risk surgery with 0.4% (RCRI #) risk of major adverse cardiac event. Patient has no cardiac history or reported symptoms  2. Pulm: Airway feasible. LILY risk: low. No pulmonary history reported  3. GI: Risk of PONV score =  1. If > 2, anti-emetic intervention recommended.      Duodenal mass:   4. Musc: VTE risk:0.5%  Patient is optimized and is acceptable candidate for the proposed procedure. No further diagnostic evaluation is needed.  Patient also evaluated by Dr. Olea. See recommendations below.  For further details of assessment, testing, and physical exam please see H and P completed on same date    Reviewed and Signed by PAC Mid-Level Provider/Resident  Mid-Level Provider/Resident: Melida Solorzano CNP  Date: 11/16/2017  Time:     Attending Anesthesiologist Anesthesia Assessment:  27 year old for EGD, EUS in management of duodenal mass. Chart reviewed, patient seen and evaluated by Dr. Gann (resident); agree with above assessment.    Patient is appropriate for the planned procedure without further workup or medical management change. The final anesthesia plan will be determined by the physician anesthesiologist caring for the patient on the day of surgery.      Reviewed and Signed by PAC Anesthesiologist  Anesthesiologist: carlos eduardo  Date: 11/16/2017  Time:   Pass/Fail: Pass  Disposition:     PAC Pharmacist Assessment:        Pharmacist:   Date:   Time:                           .

## 2017-12-04 ENCOUNTER — TELEPHONE (OUTPATIENT)
Dept: GASTROENTEROLOGY | Facility: CLINIC | Age: 27
End: 2017-12-04

## 2017-12-04 NOTE — TELEPHONE ENCOUNTER
Patient scheduled for EGD and EUS    Indication for procedure. Duodenal mass    Referring Provider. Guru Neil Jernigan MD, [facility]    ? Not Needed    Arrival time verified? Yes, 0800    Facility location verified? Yes, [address]    Instructions given regarding prep and procedure    Prep Type NPO    Are you taking any anticoagulants or blood thinners? No    Instructions given? N/A    Electronic implanted devices? No    Pre procedure teaching completed? Yes    Transportation from procedure? Wife    H&P / Pre op physical completed? Completed on 11/16/17

## 2017-12-05 ENCOUNTER — CARE COORDINATION (OUTPATIENT)
Dept: GASTROENTEROLOGY | Facility: CLINIC | Age: 27
End: 2017-12-05

## 2017-12-05 NOTE — PROGRESS NOTES
Patient called triage line to get CT results from 11/17/2017 CT.  Message sent to Dr Jernigan:     Please print the CT results and mail it to him. Essentially same as his previous CT.    CT results released to patient on [employer] per his request.    Juni MARCELINO RN Coordinator  Dr. Le, Dr. Cruz & Dr. Jernigan  Advanced Endoscopy  [phone]

## 2017-12-12 ENCOUNTER — ANESTHESIA (OUTPATIENT)
Dept: GASTROENTEROLOGY | Facility: CLINIC | Age: 27
End: 2017-12-12
Payer: COMMERCIAL

## 2017-12-12 ENCOUNTER — HOSPITAL ENCOUNTER (OUTPATIENT)
Facility: CLINIC | Age: 27
Discharge: HOME | End: 2017-12-12
Attending: INTERNAL MEDICINE | Admitting: INTERNAL MEDICINE
Payer: COMMERCIAL

## 2017-12-12 ENCOUNTER — SURGERY (OUTPATIENT)
Age: 27
End: 2017-12-12

## 2017-12-12 ENCOUNTER — ANESTHESIA EVENT (OUTPATIENT)
Dept: GASTROENTEROLOGY | Facility: CLINIC | Age: 27
End: 2017-12-12
Payer: COMMERCIAL

## 2017-12-12 VITALS
WEIGHT: 157.4 LBS | TEMPERATURE: 97.4 F | OXYGEN SATURATION: 100 % | HEART RATE: 66 BPM | BODY MASS INDEX: 23.86 KG/M2 | RESPIRATION RATE: 15 BRPM | SYSTOLIC BLOOD PRESSURE: 105 MMHG | DIASTOLIC BLOOD PRESSURE: 71 MMHG | HEIGHT: 68 IN

## 2017-12-12 PROCEDURE — 37000009 ZZH ANESTHESIA TECHNICAL FEE, EACH ADDTL 15 MIN: Performed by: INTERNAL MEDICINE

## 2017-12-12 PROCEDURE — 25000128 H RX IP 250 OP 636: Performed by: NURSE ANESTHETIST, CERTIFIED REGISTERED

## 2017-12-12 PROCEDURE — 25000125 ZZHC RX 250: Performed by: NURSE ANESTHETIST, CERTIFIED REGISTERED

## 2017-12-12 PROCEDURE — 43242 EGD US FINE NEEDLE BX/ASPIR: CPT | Performed by: INTERNAL MEDICINE

## 2017-12-12 PROCEDURE — 43239 EGD BIOPSY SINGLE/MULTIPLE: CPT | Mod: XU | Performed by: INTERNAL MEDICINE

## 2017-12-12 PROCEDURE — 88342 IMHCHEM/IMCYTCHM 1ST ANTB: CPT | Performed by: INTERNAL MEDICINE

## 2017-12-12 PROCEDURE — 00000155 ZZHCL STATISTIC H-CELL BLOCK W/STAIN: Performed by: INTERNAL MEDICINE

## 2017-12-12 PROCEDURE — 88305 TISSUE EXAM BY PATHOLOGIST: CPT | Performed by: INTERNAL MEDICINE

## 2017-12-12 PROCEDURE — 88172 CYTP DX EVAL FNA 1ST EA SITE: CPT | Performed by: INTERNAL MEDICINE

## 2017-12-12 PROCEDURE — 88173 CYTOPATH EVAL FNA REPORT: CPT | Performed by: INTERNAL MEDICINE

## 2017-12-12 PROCEDURE — 88341 IMHCHEM/IMCYTCHM EA ADD ANTB: CPT | Performed by: INTERNAL MEDICINE

## 2017-12-12 PROCEDURE — 37000008 ZZH ANESTHESIA TECHNICAL FEE, 1ST 30 MIN: Performed by: INTERNAL MEDICINE

## 2017-12-12 PROCEDURE — 43235 EGD DIAGNOSTIC BRUSH WASH: CPT | Performed by: INTERNAL MEDICINE

## 2017-12-12 RX ORDER — PROPOFOL 10 MG/ML
INJECTION, EMULSION INTRAVENOUS CONTINUOUS PRN
Status: DISCONTINUED | OUTPATIENT
Start: 2017-12-12 | End: 2017-12-12

## 2017-12-12 RX ORDER — ONDANSETRON 4 MG/1
4 TABLET, ORALLY DISINTEGRATING ORAL EVERY 30 MIN PRN
Status: DISCONTINUED | OUTPATIENT
Start: 2017-12-12 | End: 2017-12-12 | Stop reason: HOSPADM

## 2017-12-12 RX ORDER — ONDANSETRON 2 MG/ML
INJECTION INTRAMUSCULAR; INTRAVENOUS PRN
Status: DISCONTINUED | OUTPATIENT
Start: 2017-12-12 | End: 2017-12-12

## 2017-12-12 RX ORDER — SODIUM CHLORIDE, SODIUM LACTATE, POTASSIUM CHLORIDE, CALCIUM CHLORIDE 600; 310; 30; 20 MG/100ML; MG/100ML; MG/100ML; MG/100ML
INJECTION, SOLUTION INTRAVENOUS CONTINUOUS PRN
Status: DISCONTINUED | OUTPATIENT
Start: 2017-12-12 | End: 2017-12-12

## 2017-12-12 RX ORDER — NALOXONE HYDROCHLORIDE 0.4 MG/ML
.1-.4 INJECTION, SOLUTION INTRAMUSCULAR; INTRAVENOUS; SUBCUTANEOUS
Status: DISCONTINUED | OUTPATIENT
Start: 2017-12-12 | End: 2017-12-12 | Stop reason: HOSPADM

## 2017-12-12 RX ORDER — SODIUM CHLORIDE, SODIUM LACTATE, POTASSIUM CHLORIDE, CALCIUM CHLORIDE 600; 310; 30; 20 MG/100ML; MG/100ML; MG/100ML; MG/100ML
INJECTION, SOLUTION INTRAVENOUS CONTINUOUS
Status: DISCONTINUED | OUTPATIENT
Start: 2017-12-12 | End: 2017-12-12 | Stop reason: HOSPADM

## 2017-12-12 RX ORDER — ONDANSETRON 2 MG/ML
4 INJECTION INTRAMUSCULAR; INTRAVENOUS EVERY 30 MIN PRN
Status: DISCONTINUED | OUTPATIENT
Start: 2017-12-12 | End: 2017-12-12 | Stop reason: HOSPADM

## 2017-12-12 RX ORDER — PROPOFOL 10 MG/ML
INJECTION, EMULSION INTRAVENOUS PRN
Status: DISCONTINUED | OUTPATIENT
Start: 2017-12-12 | End: 2017-12-12

## 2017-12-12 RX ORDER — LEVOFLOXACIN 5 MG/ML
INJECTION, SOLUTION INTRAVENOUS PRN
Status: DISCONTINUED | OUTPATIENT
Start: 2017-12-12 | End: 2017-12-12

## 2017-12-12 RX ORDER — GLYCOPYRROLATE 0.2 MG/ML
INJECTION, SOLUTION INTRAMUSCULAR; INTRAVENOUS PRN
Status: DISCONTINUED | OUTPATIENT
Start: 2017-12-12 | End: 2017-12-12

## 2017-12-12 RX ORDER — LIDOCAINE HYDROCHLORIDE 20 MG/ML
INJECTION, SOLUTION INFILTRATION; PERINEURAL PRN
Status: DISCONTINUED | OUTPATIENT
Start: 2017-12-12 | End: 2017-12-12

## 2017-12-12 RX ORDER — MEPERIDINE HYDROCHLORIDE 25 MG/ML
12.5 INJECTION INTRAMUSCULAR; INTRAVENOUS; SUBCUTANEOUS
Status: DISCONTINUED | OUTPATIENT
Start: 2017-12-12 | End: 2017-12-12 | Stop reason: HOSPADM

## 2017-12-12 RX ADMIN — PROPOFOL 10 MG: 10 INJECTION, EMULSION INTRAVENOUS at 10:30

## 2017-12-12 RX ADMIN — PROPOFOL 20 MG: 10 INJECTION, EMULSION INTRAVENOUS at 10:27

## 2017-12-12 RX ADMIN — MIDAZOLAM 2 MG: 1 INJECTION INTRAMUSCULAR; INTRAVENOUS at 10:19

## 2017-12-12 RX ADMIN — BENZOCAINE 1 APPLICATOR: 220 SPRAY, METERED PERIODONTAL at 10:19

## 2017-12-12 RX ADMIN — ONDANSETRON 4 MG: 2 INJECTION INTRAMUSCULAR; INTRAVENOUS at 10:25

## 2017-12-12 RX ADMIN — LIDOCAINE HYDROCHLORIDE 40 MG: 20 INJECTION, SOLUTION INFILTRATION; PERINEURAL at 10:25

## 2017-12-12 RX ADMIN — SODIUM CHLORIDE, POTASSIUM CHLORIDE, SODIUM LACTATE AND CALCIUM CHLORIDE: 600; 310; 30; 20 INJECTION, SOLUTION INTRAVENOUS at 10:19

## 2017-12-12 RX ADMIN — PROPOFOL 125 MCG/KG/MIN: 10 INJECTION, EMULSION INTRAVENOUS at 10:25

## 2017-12-12 RX ADMIN — LEVOFLOXACIN 500 MG: 5 INJECTION, SOLUTION INTRAVENOUS at 10:50

## 2017-12-12 RX ADMIN — Medication 0.2 MG: at 10:24

## 2017-12-12 RX ADMIN — MIDAZOLAM 1 MG: 1 INJECTION INTRAMUSCULAR; INTRAVENOUS at 11:18

## 2017-12-12 NOTE — DISCHARGE INSTRUCTIONS
[facility] Upper Endoscopy (EGD) with Monitored Anesthesia Care  For 24 hours after your procedure  Sedation:  1. Get plenty of rest. A responsible adult must stay with you for at least 24 hours after you leave the hospital.   2. Do not drive or use heavy equipment. If you have weakness or tingling, don't drive or use heavy equipment until this feeling goes away.  3. Do not drink alcohol.  4. Avoid strenuous or risky activities. Ask for help when climbing stairs.   5. You may feel lightheaded. IF so, sit for a few minutes before standing. Have someone help you get up.   6. If you have nausea (feel sick to your stomach): Drink only clear liquids such as apple juice, ginger ale, broth or 7-Up. Rest may also help. Be sure to drink enough fluids. Move to a regular diet as you feel able.  7. You may have a slight fever. Call the doctor if your fever is over 100 F (37.7 C) (taken under the tongue) or lasts longer than 24 hours.  8. You may have a dry mouth, a sore throat, muscle aches or trouble sleeping. These should go away after 24 hours.  9. Do not make important or legal decisions.   Procedural:  1. Wait one hour before eating or drinking. Start with sips of water. When your gag reflex has returned, you may return to your normal diet, medicines, and light exercise.  2. Some bloating is normal. You may have large burps or pass air.  3. You may have a sore throat for 2 to 3 days. If so, it may help to:    Avoid hot liquids for 24 hours.    Use sore throat lozenges.    Gargle as need with salt water up to 4 times a day. Mix 1 cup of warm water with 1 teaspoon of salt. Do not swallow.  4. You may take Tylenol (acetaminophen) for pain unless your doctor has told you not to.   Do not take aspirin or ibuprofen (Advil, Motrin, or other anti-inflammatory  drugs) for __3___ days.  Call right away if you have:  1. Unusual pain in belly or chest pain not relieved with passing air.  2. Severe throat pain or trouble  swallowing.  3. Black stools (tar-like looking bowel movement).  4. Headache for over 24 hours.  5.   Follow-up:  __x__ We took small tissue or fluid samples. Your doctor will call you with the results within two weeks.  ____If you have severe pain, bleeding, vomit blood, or shortness of breath, go to an emergency room.  If you have questions, call:  Endoscopy: Monday to Friday, 7 a.m. to 4:30 p.m. [phone] (We may have to call you back)  After hours: Hospital  [phone] (Ask for the GI fellow on call)  [facility] Endoscopic Ultrasound with Monitored Anesthesia Care  For 24 hours after your procedure  Sedation:  10. Get plenty of rest. A responsible adult must stay with you for at least 24 hours after you leave the hospital.   11. Do not drive or use heavy equipment. If you have weakness or tingling, don't drive or use heavy equipment until this feeling goes away.  12. Do not drink alcohol.  13. Avoid strenuous or risky activities. Ask for help when climbing stairs.   14. You may feel lightheaded. IF so, sit for a few minutes before standing. Have someone help you get up.   15. If you have nausea (feel sick to your stomach): Drink only clear liquids such as apple juice, ginger ale, broth or 7-Up. Rest may also help. Be sure to drink enough fluids. Move to a regular diet as you feel able.  16. You may have a slight fever. Call the doctor if your fever is over 100 F (37.7 C) (taken under the tongue) or lasts longer than 24 hours.  17. You may have a dry mouth, a sore throat, muscle aches or trouble sleeping. These should go away after 24 hours.  18. Do not make important or legal decisions.   Procedural:  5. Wait one hour before eating or drinking. Start with sips of water. When your gag reflex has returned, you may return to your normal diet, medicines, and light exercise.  6. Some bloating is normal. You may have large burps or pass air.  7. You may have a sore throat for 2 to 3 days. If so, it may help  to:    Avoid hot liquids for 24 hours.    Use sore throat lozenges.    Gargle as need with salt water up to 4 times a day. Mix 1 cup of warm water with 1 teaspoon of salt. Do not swallow.  8. You may take Tylenol (acetaminophen) for pain unless your doctor has told you not to.   Do not take aspirin or ibuprofen (Advil, Motrin, or other anti-inflammatory  drugs) for __3___ days.  Call right away if you have:  6. Unusual pain in belly or chest pain not relieved with passing air.  7. Severe throat pain or trouble swallowing.  8. Black stools (tar-like looking bowel movement).  9.   10. It has been over 8 to 10 hours since surgery and you are still not able to urinate (pass water).  11. Headache for over 24 hours.  12.   Follow-up:  __x__ We took small tissue or fluid samples. Your doctor will call you with the results within two weeks.  If you have severe pain, bleeding, vomit blood, or shortness of breath, go to an emergency room.  If you have questions, call:  Endoscopy: Monday to Friday, 7 a.m. to 4:30 p.m. [phone] (We may have to call you back)  After hours: Hospital  [phone] (Ask for the GI fellow on call)

## 2017-12-12 NOTE — ANESTHESIA POSTPROCEDURE EVALUATION
Patient: Gordo Bearden    Procedure(s):  eus and egd - Wound Class: II-Clean Contaminated   - Wound Class: II-Clean Contaminated    Diagnosis:duodenal mass  Diagnosis Additional Information: No value filed.    Anesthesia Type:  MAC    Note:  Anesthesia Post Evaluation    Patient location during evaluation: PACU  Patient participation: Able to fully participate in evaluation  Level of consciousness: awake  Pain management: adequate  Airway patency: patent  Cardiovascular status: acceptable  Respiratory status: acceptable  Hydration status: acceptable  PONV: none     Anesthetic complications: None          Last vitals:  Vitals:    12/12/17 1141 12/12/17 1142 12/12/17 1143   BP:  103/69    Pulse:  66    Resp: 14 14 18   Temp:  36.3  C (97.4  F)    SpO2: 98% 99% (!) 84%         Electronically Signed By: J Carlos Acuña MD  December 12, 2017  11:48 AM

## 2017-12-12 NOTE — ANESTHESIA CARE TRANSFER NOTE
Patient: Gordo Bearden    Procedure(s):  eus and egd - Wound Class: II-Clean Contaminated   - Wound Class: II-Clean Contaminated    Diagnosis: duodenal mass  Diagnosis Additional Information: No value filed.    Anesthesia Type:   MAC     Note:  Airway :Room Air  Patient transferred to:PACU  Comments: Patient oxygenating and ventilating well on 6LFM.  Patient CHOUDHURY, following commands, and denies pain.  Report given to RN and VSS. Handoff Report: Identifed the Patient, Identified the Reponsible Provider, Reviewed the pertinent medical history, Discussed the surgical course, Reviewed Intra-OP anesthesia mangement and issues during anesthesia, Set expectations for post-procedure period and Allowed opportunity for questions and acknowledgement of understanding      Vitals: (Last set prior to Anesthesia Care Transfer)    CRNA VITALS  12/12/2017 1111 - 12/12/2017 1141      12/12/2017             Resp Rate (set): 10                Electronically Signed By: EUSEBIO Mercado CRNA  December 12, 2017  11:41 AM

## 2017-12-12 NOTE — IP AVS SNAPSHOT
MRN:[id number]                      After Visit Summary   12/12/2017    Gordo Bearden    MRN: [id number]           Thank you!     Thank you for choosing [facility] for your care. Our goal is always to provide you with excellent care. Hearing back from our patients is one way we can continue to improve our services. Please take a few minutes to complete the written survey that you may receive in the mail after you visit with us. Thank you!        Patient Information     Date Of Birth          1990        About your hospital stay     You were admitted on:  December 12, 2017 You last received care in the:  [facility], Endoscopy    You were discharged on:  December 12, 2017       Who to Call     For medical emergencies, please call 911.  For non-urgent questions about your medical care, please call your primary care provider or clinic, None  For questions related to your surgery, please call your surgery clinic        Attending Provider     Provider Guru Neil Delarosa MD Gastroenterology       Primary Care Provider Fax #    Physician No Ref-Primary [phone]      Further instructions from your care team       [facility] Upper Endoscopy (EGD) with Monitored Anesthesia Care  For 24 hours after your procedure  Sedation:  1. Get plenty of rest. A responsible adult must stay with you for at least 24 hours after you leave the hospital.   2. Do not drive or use heavy equipment. If you have weakness or tingling, don't drive or use heavy equipment until this feeling goes away.  3. Do not drink alcohol.  4. Avoid strenuous or risky activities. Ask for help when climbing stairs.   5. You may feel lightheaded. IF so, sit for a few minutes before standing. Have someone help you get up.   6. If you have nausea (feel sick to your stomach): Drink only clear liquids such as apple juice, ginger ale, broth or 7-Up. Rest may also help. Be sure to drink enough fluids. Move to a regular  diet as you feel able.  7. You may have a slight fever. Call the doctor if your fever is over 100 F (37.7 C) (taken under the tongue) or lasts longer than 24 hours.  8. You may have a dry mouth, a sore throat, muscle aches or trouble sleeping. These should go away after 24 hours.  9. Do not make important or legal decisions.   Procedural:  1. Wait one hour before eating or drinking. Start with sips of water. When your gag reflex has returned, you may return to your normal diet, medicines, and light exercise.  2. Some bloating is normal. You may have large burps or pass air.  3. You may have a sore throat for 2 to 3 days. If so, it may help to:    Avoid hot liquids for 24 hours.    Use sore throat lozenges.    Gargle as need with salt water up to 4 times a day. Mix 1 cup of warm water with 1 teaspoon of salt. Do not swallow.  4. You may take Tylenol (acetaminophen) for pain unless your doctor has told you not to.   Do not take aspirin or ibuprofen (Advil, Motrin, or other anti-inflammatory  drugs) for __3___ days.  Call right away if you have:  1. Unusual pain in belly or chest pain not relieved with passing air.  2. Severe throat pain or trouble swallowing.  3. Black stools (tar-like looking bowel movement).  4. Headache for over 24 hours.  5.   Follow-up:  __x__ We took small tissue or fluid samples. Your doctor will call you with the results within two weeks.  ____If you have severe pain, bleeding, vomit blood, or shortness of breath, go to an emergency room.  If you have questions, call:  Endoscopy: Monday to Friday, 7 a.m. to 4:30 p.m. [phone] (We may have to call you back)  After hours: Hospital  [phone] (Ask for the GI fellow on call)  [facility] Endoscopic Ultrasound with Monitored Anesthesia Care  For 24 hours after your procedure  Sedation:  10. Get plenty of rest. A responsible adult must stay with you for at least 24 hours after you leave the hospital.   11. Do not drive or use heavy  equipment. If you have weakness or tingling, don't drive or use heavy equipment until this feeling goes away.  12. Do not drink alcohol.  13. Avoid strenuous or risky activities. Ask for help when climbing stairs.   14. You may feel lightheaded. IF so, sit for a few minutes before standing. Have someone help you get up.   15. If you have nausea (feel sick to your stomach): Drink only clear liquids such as apple juice, ginger ale, broth or 7-Up. Rest may also help. Be sure to drink enough fluids. Move to a regular diet as you feel able.  16. You may have a slight fever. Call the doctor if your fever is over 100 F (37.7 C) (taken under the tongue) or lasts longer than 24 hours.  17. You may have a dry mouth, a sore throat, muscle aches or trouble sleeping. These should go away after 24 hours.  18. Do not make important or legal decisions.   Procedural:  5. Wait one hour before eating or drinking. Start with sips of water. When your gag reflex has returned, you may return to your normal diet, medicines, and light exercise.  6. Some bloating is normal. You may have large burps or pass air.  7. You may have a sore throat for 2 to 3 days. If so, it may help to:    Avoid hot liquids for 24 hours.    Use sore throat lozenges.    Gargle as need with salt water up to 4 times a day. Mix 1 cup of warm water with 1 teaspoon of salt. Do not swallow.  8. You may take Tylenol (acetaminophen) for pain unless your doctor has told you not to.   Do not take aspirin or ibuprofen (Advil, Motrin, or other anti-inflammatory  drugs) for __3___ days.  Call right away if you have:  6. Unusual pain in belly or chest pain not relieved with passing air.  7. Severe throat pain or trouble swallowing.  8. Black stools (tar-like looking bowel movement).  9.   10. It has been over 8 to 10 hours since surgery and you are still not able to urinate (pass water).  11. Headache for over 24 hours.  12.   Follow-up:  __x__ We took small tissue or fluid  "samples. Your doctor will call you with the results within two weeks.  If you have severe pain, bleeding, vomit blood, or shortness of breath, go to an emergency room.  If you have questions, call:  Endoscopy: Monday to Friday, 7 a.m. to 4:30 p.m. [phone] (We may have to call you back)  After hours: Hospital  [phone] (Ask for the GI fellow on call)              Pending Results     No orders found from 12/10/2017 to 12/13/2017.            Admission Information     Date & Time Provider Department Dept. Phone    12/12/2017 Guru Neil Jernigan MD [facility], [place], Endoscopy [phone]      Your Vitals Were     Blood Pressure Pulse Temperature Respirations Height Weight    100/64 66 97.4  F (36.3  C) (Oral) 5 1.734 m (5' 8.25\") 71.4 kg (157 lb 6.4 oz)    Pulse Oximetry BMI (Body Mass Index)                99% 23.76 kg/m2          [facility] Information     Education Everytime gives you secure access to your electronic health record. If you see a primary care provider, you can also send messages to your care team and make appointments. If you have questions, please call your primary care clinic.  If you do not have a primary care provider, please call [phone] and they will assist you.        Care EveryWhere ID     This is your Care EveryWhere ID. This could be used by other organizations to access your [place] medical records  [id number]        Equal Access to Services     MEGAN AGUILAR : Hadii aad ku hadasho Soomaali, waaxda luqadaha, qaybta kaalmada adeegyada, silvia leonardo. So [facility] [phone].    ATENCIÓN: Si habla español, tiene a gill disposición servicios gratuitos de asistencia lingüística. Llame al [phone].    We comply with applicable federal civil rights laws and [place] laws. We do not discriminate on the basis of race, color, national origin, age, disability, sex, sexual orientation, or gender identity.               Review of your medicines    "   Notice     You have not been prescribed any medications.             Protect others around you: Learn how to safely use, store and throw away your medicines at www.disposemymeds.org.             Medication List: This is a list of all your medications and when to take them. Check marks below indicate your daily home schedule. Keep this list as a reference.      Notice     You have not been prescribed any medications.

## 2017-12-12 NOTE — ANESTHESIA PREPROCEDURE EVALUATION
Anesthesia Evaluation         Anesthesia Plan      History & Physical Review  History and physical reviewed and following examination; no interval change.    ASA Status:  3 .    NPO Status:  > 6 hours    Plan for MAC with Intravenous induction. Maintenance will be TIVA.  Reason for MAC:  Difficulty with conscious sedation (QS)         Postoperative Care      Consents  Anesthetic plan, risks, benefits and alternatives discussed with:  Patient..          ANESTHESIA PREOP EVALUATION    NPO Status:  Yes, NPO    Procedure: Procedure(s):  eus and egd - Wound Class: II-Clean Contaminated   - Wound Class: II-Clean Contaminated    HPI: Presents for EUS OF UPPER GI TRACT     PMHx/PSHx/ROS:  PAST MEDICAL HISTORY:   Past Medical History:   Diagnosis Date     Duodenal mass 2015     Duodenal ulcer 2015       PAST SURGICAL HISTORY:   Past Surgical History:   Procedure Laterality Date     UPPER GI ENDOSCOPY         FAMILY HISTORY:   Family History   Problem Relation Age of Onset     Hypertension Mother      Leukemia Brother 3     HEART DISEASE Maternal Grandfather          Past Anes Hx: No personal or family h/o anesthesia problems    Soc Hx:   Tobacco:   EtOH:     Allergies:   Allergies   Allergen Reactions     Bees        Meds:   No prescriptions prior to admission.       No current outpatient prescriptions on file.       Physical Exam:  VS: T Data Unavailable, P Data Unavailable, /77, R 16, SpO2 99% Weight   Wt Readings from Last 2 Encounters:   12/12/17 71.4 kg (157 lb 6.4 oz)   11/16/17 71.7 kg (158 lb 1.1 oz)         Airway: MP 2, TM>3FB, Neck full ROM  Dentition: no loose teeth  Heart: RRR  Lungs: CTAB      BMP:  Lab Results   Component Value Date     11/16/2017      Lab Results   Component Value Date    POTASSIUM 4.1 11/16/2017     Lab Results   Component Value Date    CHLORIDE 104 11/16/2017     Lab Results   Component Value Date    RAJESH 9.2 11/16/2017     Lab Results   Component Value Date    CO2 29  11/16/2017     Lab Results   Component Value Date    BUN 10 11/16/2017     Lab Results   Component Value Date    CR 0.74 11/16/2017     Lab Results   Component Value Date    GLC 90 11/16/2017        CBC:  Lab Results   Component Value Date    WBC 3.8 11/16/2017     Lab Results   Component Value Date    HGB 13.9 11/16/2017     Lab Results   Component Value Date    HCT 41.0 11/16/2017     Lab Results   Component Value Date     11/16/2017        Coags/Type and Screen  Lab Results   Component Value Date    INR 0.99 11/16/2017     No results found for: PT  Type and Screen:      Assessment/Plan:  - ASA 3  - MAC with standard ASA monitors, IV induction, balanced anesthetic  - PIV  - Antibiotics per surgery  - PONV prophylaxis  - Blood products available, possible administration discussed with patient    J Carlos Acuña MD    12/12/2017  9:46 AM

## 2017-12-12 NOTE — ADDENDUM NOTE
Addendum  created 12/12/17 6830 by Qing Bobby, EUSEBIO CRNA    Anesthesia Intra Flowsheets edited, Anesthesia Intra Meds edited, Orders acknowledged in Narrator

## 2017-12-12 NOTE — OR NURSING
EGD done w/ biopsies sent to lab, EUS done w/ FNA, lab present, took specimens.  Done under MAC, VS and medications per CRNA

## 2017-12-12 NOTE — IP AVS SNAPSHOT
[facility], [place], Endoscopy    [address] [address]    Phone:  [phone]                                       After Visit Summary   12/12/2017    Gordo Bearden    MRN: [id number]           After Visit Summary Signature Page     I have received my discharge instructions, and my questions have been answered. I have discussed any challenges I see with this plan with the nurse or doctor.    ..........................................................................................................................................  Patient/Patient Representative Signature      ..........................................................................................................................................  Patient Representative Print Name and Relationship to Patient    ..................................................               ................................................  Date                                            Time    ..........................................................................................................................................  Reviewed by Signature/Title    ...................................................              ..............................................  Date                                                            Time

## 2017-12-14 LAB
COPATH REPORT: NORMAL
COPATH REPORT: NORMAL

## 2017-12-20 ENCOUNTER — CARE COORDINATION (OUTPATIENT)
Dept: GASTROENTEROLOGY | Facility: CLINIC | Age: 27
End: 2017-12-20

## 2017-12-20 NOTE — PROGRESS NOTES
Post upper GI endoscopy (12/12/2017) with Dr. Jernigan: Follow-up    Post procedure recommendations: Per Dr. Jernigan: please remind me to call him with his biopsy results tomorrow, If you want to call him for post procedure check tell him, I will reach out to him and the biopsy results were unremarkable or inconclusive. We may need to do an octreotide scan. But we can order after I speak to him. Please tell him I am discussing with pathology before calling him     Patient states he is doing well.  No pain, nausea, vomiting or fevers.  Advised Dr. Jernigan will be in contact with him with his pathology results.    Orders placed: none at this time    Contact information verified for future questions/concerns.    Juni MARCELINO, RN Coordinator  Dr. Le, Dr. Cruz & Dr. Jernigan  Advanced Endoscopy  [phone]

## 2017-12-28 ENCOUNTER — CARE COORDINATION (OUTPATIENT)
Dept: GASTROENTEROLOGY | Facility: CLINIC | Age: 27
End: 2017-12-28

## 2017-12-28 ENCOUNTER — TELEPHONE (OUTPATIENT)
Dept: PHARMACY | Facility: CLINIC | Age: 27
End: 2017-12-28

## 2017-12-28 DIAGNOSIS — K20.0 EOSINOPHILIC ESOPHAGITIS: Primary | ICD-10-CM

## 2017-12-28 LAB — UPPER EUS: NORMAL

## 2017-12-28 RX ORDER — NICOTINE POLACRILEX 4 MG/1
20 GUM, CHEWING ORAL 2 TIMES DAILY
Qty: 60 TABLET | Refills: 2 | Status: SHIPPED | OUTPATIENT
Start: 2017-12-28 | End: 2018-03-28

## 2017-12-28 NOTE — PROGRESS NOTES
Message received to organize the following:  He needs omeprazole 20 mg BID for 12 weeks   And Budesonide swallow  For EoE. There is a standard dosage and you can ask luminal RN coordinators to help you with that. Otherwise we can do this tomorrow     He also needs a repeat upper endoscopy scheduled with me in 3 months     Omeprazole ordered and sent to patient's [facility] pharmacy in [facility].  Budesonide to be filled at the [facility] compound pharmacy and mailed to the patient as most retail pharmacy's do not fill, faxed script to [phone].    Order placed for upper endoscopy, sent to [facility] to be scheduled.    Called patient to advise omeprazole will be sent to his pharmacy and budesonide will be mailed to him.    Juni MARCELINO RN Coordinator  Dr. Le, Dr. Cruz & Dr. Jernigan  Advanced Endoscopy  [phone]

## 2018-01-02 ENCOUNTER — TELEPHONE (OUTPATIENT)
Dept: GASTROENTEROLOGY | Facility: CLINIC | Age: 28
End: 2018-01-02

## 2018-01-04 ENCOUNTER — CARE COORDINATION (OUTPATIENT)
Dept: GASTROENTEROLOGY | Facility: CLINIC | Age: 28
End: 2018-01-04

## 2018-01-04 NOTE — PROGRESS NOTES
Message received from triage RN saying patient would like his scan done on the same day as his next repeat EUS.  Per Dr. Jernigan's post procedure note he is awaiting cytology results before scheduling either an octreotide scan or DOTATATE scan.  Called patient to advise we would be in touch after cytology results.  Patient agreeable to plan and will await cytology results.    Juni MARCELINO RN Coordinator  Dr. Le, Dr. Cruz & Dr. Jernigan  Advanced Endoscopy  [phone]

## 2018-02-27 DIAGNOSIS — K31.89 MASS OF DUODENUM: Primary | ICD-10-CM

## 2018-03-20 ENCOUNTER — TRANSFERRED RECORDS (OUTPATIENT)
Dept: HEALTH INFORMATION MANAGEMENT | Facility: CLINIC | Age: 28
End: 2018-03-20

## 2018-03-27 ENCOUNTER — TELEPHONE (OUTPATIENT)
Dept: GASTROENTEROLOGY | Facility: CLINIC | Age: 28
End: 2018-03-27

## 2018-03-27 DIAGNOSIS — K20.0 EOSINOPHILIC ESOPHAGITIS: ICD-10-CM

## 2018-03-27 NOTE — TELEPHONE ENCOUNTER
Patient scheduled for EUD and EGD     Indication for procedure. Second opinion from OSH    Referring Provider. Rere     ? No    Arrival time verified? Patient instructed to arrive at 0630; verbalized understanding.     Facility location verified? [address]     Instructions given regarding prep and procedure    Prep Type NPO 6-8 hours prior.    Are you taking any anticoagulants or blood thinners? Denies     Instructions given? Yes. RN answered all questions.     Electronic implanted devices? Denies     Pre procedure teaching completed? Yes    Transportation from procedure? Wife with drive and stay with patient.     H&P / Pre op physical completed? Completed with Dr Mcclain.    Jorge Mcnair RN

## 2018-04-03 ENCOUNTER — HOSPITAL ENCOUNTER (OUTPATIENT)
Facility: CLINIC | Age: 28
Discharge: HOME | End: 2018-04-03
Attending: INTERNAL MEDICINE | Admitting: INTERNAL MEDICINE
Payer: COMMERCIAL

## 2018-04-03 ENCOUNTER — TELEPHONE (OUTPATIENT)
Dept: GASTROENTEROLOGY | Facility: CLINIC | Age: 28
End: 2018-04-03

## 2018-04-03 ENCOUNTER — SURGERY (OUTPATIENT)
Age: 28
End: 2018-04-03

## 2018-04-03 ENCOUNTER — ANESTHESIA EVENT (OUTPATIENT)
Dept: GASTROENTEROLOGY | Facility: CLINIC | Age: 28
End: 2018-04-03
Payer: COMMERCIAL

## 2018-04-03 ENCOUNTER — ANESTHESIA (OUTPATIENT)
Dept: GASTROENTEROLOGY | Facility: CLINIC | Age: 28
End: 2018-04-03
Payer: COMMERCIAL

## 2018-04-03 VITALS
SYSTOLIC BLOOD PRESSURE: 111 MMHG | HEART RATE: 69 BPM | TEMPERATURE: 97.7 F | DIASTOLIC BLOOD PRESSURE: 82 MMHG | OXYGEN SATURATION: 98 % | RESPIRATION RATE: 15 BRPM

## 2018-04-03 LAB — UPPER GI ENDOSCOPY: NORMAL

## 2018-04-03 PROCEDURE — 25000132 ZZH RX MED GY IP 250 OP 250 PS 637: Performed by: INTERNAL MEDICINE

## 2018-04-03 PROCEDURE — 43239 EGD BIOPSY SINGLE/MULTIPLE: CPT | Performed by: INTERNAL MEDICINE

## 2018-04-03 PROCEDURE — 37000009 ZZH ANESTHESIA TECHNICAL FEE, EACH ADDTL 15 MIN: Performed by: INTERNAL MEDICINE

## 2018-04-03 PROCEDURE — 25000128 H RX IP 250 OP 636: Performed by: NURSE ANESTHETIST, CERTIFIED REGISTERED

## 2018-04-03 PROCEDURE — 37000008 ZZH ANESTHESIA TECHNICAL FEE, 1ST 30 MIN: Performed by: INTERNAL MEDICINE

## 2018-04-03 PROCEDURE — 88305 TISSUE EXAM BY PATHOLOGIST: CPT | Performed by: INTERNAL MEDICINE

## 2018-04-03 PROCEDURE — 25000125 ZZHC RX 250: Performed by: NURSE ANESTHETIST, CERTIFIED REGISTERED

## 2018-04-03 RX ORDER — PROPOFOL 10 MG/ML
INJECTION, EMULSION INTRAVENOUS CONTINUOUS PRN
Status: DISCONTINUED | OUTPATIENT
Start: 2018-04-03 | End: 2018-04-03

## 2018-04-03 RX ORDER — SODIUM CHLORIDE, SODIUM LACTATE, POTASSIUM CHLORIDE, CALCIUM CHLORIDE 600; 310; 30; 20 MG/100ML; MG/100ML; MG/100ML; MG/100ML
INJECTION, SOLUTION INTRAVENOUS CONTINUOUS
Status: CANCELLED | OUTPATIENT
Start: 2018-04-03

## 2018-04-03 RX ORDER — SIMETHICONE
LIQUID (ML) MISCELLANEOUS PRN
Status: DISCONTINUED | OUTPATIENT
Start: 2018-04-03 | End: 2018-04-03 | Stop reason: HOSPADM

## 2018-04-03 RX ORDER — FENTANYL CITRATE 50 UG/ML
25-50 INJECTION, SOLUTION INTRAMUSCULAR; INTRAVENOUS
Status: CANCELLED | OUTPATIENT
Start: 2018-04-03

## 2018-04-03 RX ORDER — SODIUM CHLORIDE, SODIUM LACTATE, POTASSIUM CHLORIDE, CALCIUM CHLORIDE 600; 310; 30; 20 MG/100ML; MG/100ML; MG/100ML; MG/100ML
INJECTION, SOLUTION INTRAVENOUS CONTINUOUS PRN
Status: DISCONTINUED | OUTPATIENT
Start: 2018-04-03 | End: 2018-04-03

## 2018-04-03 RX ORDER — LIDOCAINE HYDROCHLORIDE 20 MG/ML
INJECTION, SOLUTION INFILTRATION; PERINEURAL PRN
Status: DISCONTINUED | OUTPATIENT
Start: 2018-04-03 | End: 2018-04-03

## 2018-04-03 RX ORDER — PROPOFOL 10 MG/ML
INJECTION, EMULSION INTRAVENOUS PRN
Status: DISCONTINUED | OUTPATIENT
Start: 2018-04-03 | End: 2018-04-03

## 2018-04-03 RX ORDER — NALOXONE HYDROCHLORIDE 0.4 MG/ML
.1-.4 INJECTION, SOLUTION INTRAMUSCULAR; INTRAVENOUS; SUBCUTANEOUS
Status: CANCELLED | OUTPATIENT
Start: 2018-04-03 | End: 2018-04-04

## 2018-04-03 RX ORDER — MEPERIDINE HYDROCHLORIDE 25 MG/ML
12.5 INJECTION INTRAMUSCULAR; INTRAVENOUS; SUBCUTANEOUS
Status: CANCELLED | OUTPATIENT
Start: 2018-04-03

## 2018-04-03 RX ORDER — FENTANYL CITRATE 50 UG/ML
INJECTION, SOLUTION INTRAMUSCULAR; INTRAVENOUS PRN
Status: DISCONTINUED | OUTPATIENT
Start: 2018-04-03 | End: 2018-04-03

## 2018-04-03 RX ORDER — GLYCOPYRROLATE 0.2 MG/ML
INJECTION, SOLUTION INTRAMUSCULAR; INTRAVENOUS PRN
Status: DISCONTINUED | OUTPATIENT
Start: 2018-04-03 | End: 2018-04-03

## 2018-04-03 RX ORDER — ONDANSETRON 4 MG/1
4 TABLET, ORALLY DISINTEGRATING ORAL EVERY 30 MIN PRN
Status: CANCELLED | OUTPATIENT
Start: 2018-04-03

## 2018-04-03 RX ORDER — ONDANSETRON 2 MG/ML
4 INJECTION INTRAMUSCULAR; INTRAVENOUS EVERY 30 MIN PRN
Status: CANCELLED | OUTPATIENT
Start: 2018-04-03

## 2018-04-03 RX ADMIN — PROPOFOL 10 MG: 10 INJECTION, EMULSION INTRAVENOUS at 08:31

## 2018-04-03 RX ADMIN — FENTANYL CITRATE 50 MCG: 50 INJECTION, SOLUTION INTRAMUSCULAR; INTRAVENOUS at 08:13

## 2018-04-03 RX ADMIN — BENZOCAINE 1 EACH: 220 SPRAY, METERED PERIODONTAL at 08:07

## 2018-04-03 RX ADMIN — GLYCOPYRROLATE 0.1 MG: 0.2 INJECTION, SOLUTION INTRAMUSCULAR; INTRAVENOUS at 08:19

## 2018-04-03 RX ADMIN — BENZOCAINE 1 EACH: 220 SPRAY, METERED PERIODONTAL at 08:15

## 2018-04-03 RX ADMIN — PROPOFOL 100 MCG/KG/MIN: 10 INJECTION, EMULSION INTRAVENOUS at 08:12

## 2018-04-03 RX ADMIN — Medication 2 ML: at 08:18

## 2018-04-03 RX ADMIN — GLYCOPYRROLATE 0.2 MG: 0.2 INJECTION, SOLUTION INTRAMUSCULAR; INTRAVENOUS at 08:07

## 2018-04-03 RX ADMIN — SODIUM CHLORIDE, POTASSIUM CHLORIDE, SODIUM LACTATE AND CALCIUM CHLORIDE: 600; 310; 30; 20 INJECTION, SOLUTION INTRAVENOUS at 07:52

## 2018-04-03 RX ADMIN — MIDAZOLAM 2 MG: 1 INJECTION INTRAMUSCULAR; INTRAVENOUS at 08:08

## 2018-04-03 RX ADMIN — LIDOCAINE HYDROCHLORIDE 30 MG: 20 INJECTION, SOLUTION INFILTRATION; PERINEURAL at 08:16

## 2018-04-03 NOTE — ANESTHESIA POSTPROCEDURE EVALUATION
Patient: Gordo Bearden    Procedure(s):  EUS - Wound Class: II-Clean Contaminated    Diagnosis:EUS DX:Eosinophilic esophagitis prep mailed  Diagnosis Additional Information: No value filed.    Anesthesia Type:  MAC    Note:  Anesthesia Post Evaluation    Patient location during evaluation: Phase 2  Patient participation: Able to fully participate in evaluation  Level of consciousness: awake and alert  Pain management: adequate  Airway patency: patent  Cardiovascular status: acceptable  Respiratory status: acceptable  Hydration status: acceptable  PONV: none     Anesthetic complications: None          Last vitals:  Vitals:    04/03/18 0656 04/03/18 0851   BP: 109/75 99/62   Pulse:  69   Resp: 12 11   Temp: 36.4  C (97.6  F) 36.5  C (97.7  F)   SpO2: 95% 95%         Electronically Signed By: Iain Esteves MD  April 3, 2018  8:57 AM

## 2018-04-03 NOTE — IP AVS SNAPSHOT
[facility], [place], Endoscopy    [address] [address]    Phone:  [phone]                                       After Visit Summary   4/3/2018    Gordo Bearden    MRN: [id number]           After Visit Summary Signature Page     I have received my discharge instructions, and my questions have been answered. I have discussed any challenges I see with this plan with the nurse or doctor.    ..........................................................................................................................................  Patient/Patient Representative Signature      ..........................................................................................................................................  Patient Representative Print Name and Relationship to Patient    ..................................................               ................................................  Date                                            Time    ..........................................................................................................................................  Reviewed by Signature/Title    ...................................................              ..............................................  Date                                                            Time

## 2018-04-03 NOTE — ANESTHESIA CARE TRANSFER NOTE
Patient: Gordo Bearden    Procedure(s):  EUS - Wound Class: II-Clean Contaminated    Diagnosis: EUS DX:Eosinophilic esophagitis prep mailed  Diagnosis Additional Information: No value filed.    Anesthesia Type:   MAC     Note:  Airway :Room Air  Patient transferred to:Phase II  Comments: Patient transferred to PACU spontaneously breathing.  VSS.  Report to RN. Handoff Report: Identifed the Patient, Identified the Reponsible Provider, Reviewed the pertinent medical history, Discussed the surgical course, Reviewed Intra-OP anesthesia mangement and issues during anesthesia, Set expectations for post-procedure period and Allowed opportunity for questions and acknowledgement of understanding      Vitals: (Last set prior to Anesthesia Care Transfer)    CRNA VITALS  4/3/2018 0815 - 4/3/2018 0848      4/3/2018             Pulse: 93    Ht Rate: 98    SpO2: 97 %                Electronically Signed By: EUSEBIO Clancy CRNA  April 3, 2018  8:48 AM

## 2018-04-03 NOTE — ANESTHESIA PREPROCEDURE EVALUATION
Anesthesia Evaluation     . Pt has had prior anesthetic. Type: General and MAC    No history of anesthetic complications          ROS/MED HX    ENT/Pulmonary:  - neg pulmonary ROS     Neurologic:  - neg neurologic ROS     Cardiovascular:  - neg cardiovascular ROS   (+) ----. : . . . :. . No previous cardiac testing       METS/Exercise Tolerance:  >4 METS   Hematologic:  - neg hematologic  ROS       Musculoskeletal:  - neg musculoskeletal ROS       GI/Hepatic: Comment: Controls with diet    (+) GERD Other, Other GI/Hepatic Duodenal mass      Renal/Genitourinary:  - ROS Renal section negative       Endo:  - neg endo ROS       Psychiatric:  - neg psychiatric ROS       Infectious Disease:  - neg infectious disease ROS       Malignancy:      - no malignancy   Other:                     Physical Exam  Normal systems: cardiovascular, pulmonary and dental    Airway   Mallampati: II  TM distance: >3 FB  Neck ROM: full    Dental     Cardiovascular   Rhythm and rate: regular and normal      Pulmonary    breath sounds clear to auscultation                    Anesthesia Plan      History & Physical Review  History and physical reviewed and following examination; no interval change.    ASA Status:  2 .        Plan for MAC with Intravenous and Propofol induction. Maintenance will be TIVA.  Reason for MAC:  Deep or markedly invasive procedure (G8)  PONV prophylaxis:  Ondansetron (or other 5HT-3)       Postoperative Care  Postoperative pain management:  IV analgesics.      Consents  Anesthetic plan, risks, benefits and alternatives discussed with:  Patient.  Use of blood products discussed: No .   .

## 2018-04-03 NOTE — IP AVS SNAPSHOT
MRN:[id number]                      After Visit Summary   4/3/2018    Gordo Bearden    MRN: [id number]           Thank you!     Thank you for choosing [facility] for your care. Our goal is always to provide you with excellent care. Hearing back from our patients is one way we can continue to improve our services. Please take a few minutes to complete the written survey that you may receive in the mail after you visit with us. Thank you!        Patient Information     Date Of Birth          1990        About your hospital stay     You were admitted on:  April 3, 2018 You last received care in the:  [facility], Endoscopy    You were discharged on:  April 3, 2018       Who to Call     For medical emergencies, please call 911.  For non-urgent questions about your medical care, please call your primary care provider or clinic, None  For questions related to your surgery, please call your surgery clinic        Attending Provider     Provider Guru Neil Delarosa MD Gastroenterology       Primary Care Provider Fax #    Physician No Ref-Primary [phone]      Your next 10 appointments already scheduled     Apr 05, 2018  8:15 AM CDT   (Arrive by 8:00 AM)   PET GA 68 DOTATATE with UUPET1   [facility] PET CT ([facility] [place])    [address] [address] [phone]           Drink 32 ounces of water to ensure adequate hydration prior to administration of Ga 68 dotatate. Drink and void frequently during the first hours following administration to reduce radiation exposure.  Tell your doctor: o If there is any chance you may be pregnant or if you are breastfeeding. o If you have problems lying in small spaces (claustrophobia). If you do, your doctor may give you medicine to help you relax.  Please call your Imaging Department at your exam site with any questions.              Further instructions from  your care team       [facility] Endoscopic Ultrasound with Monitored Anesthesia Care  For 24 hours after your procedure  Sedation:  1. Get plenty of rest. A responsible adult must stay with you for at least 24 hours after you leave the hospital.   2. Do not drive or use heavy equipment. If you have weakness or tingling, don't drive or use heavy equipment until this feeling goes away.  3. Do not drink alcohol.  4. Avoid strenuous or risky activities. Ask for help when climbing stairs.   5. You may feel lightheaded. IF so, sit for a few minutes before standing. Have someone help you get up.   6. If you have nausea (feel sick to your stomach): Drink only clear liquids such as apple juice, ginger ale, broth or 7-Up. Rest may also help. Be sure to drink enough fluids. Move to a regular diet as you feel able.  7. You may have a slight fever. Call the doctor if your fever is over 100 F (37.7 C) (taken under the tongue) or lasts longer than 24 hours.  8. You may have a dry mouth, a sore throat, muscle aches or trouble sleeping. These should go away after 24 hours.  9. Do not make important or legal decisions.   Procedural:  1. Wait one hour before eating or drinking. Start with sips of water. When your gag reflex has returned, you may return to your normal diet, medicines, and light exercise.  2. Some bloating is normal. You may have large burps or pass air.  3. You may have a sore throat for 2 to 3 days. If so, it may help to:    Avoid hot liquids for 24 hours.    Use sore throat lozenges.    Gargle as need with salt water up to 4 times a day. Mix 1 cup of warm water with 1 teaspoon of salt. Do not swallow.  4. You may take Tylenol (acetaminophen) for pain unless your doctor has told you not to.   Do not take aspirin or ibuprofen (Advil, Motrin, or other anti-inflammatory  drugs) for __3___ days.  Call right away if you have:  1. Unusual pain in belly or chest pain not relieved with passing air.  2. Severe throat pain or trouble  swallowing.  3. Black stools (tar-like looking bowel movement).  4. It has been over 8 to 10 hours since surgery and you are still not able to urinate (pass water).  5. Headache for over 24 hours.  6.   Follow-up:  __x__ We took small tissue or fluid samples. Your doctor will call you with the results within two weeks.  If you have severe pain, bleeding, vomit blood, or shortness of breath, go to an emergency room.  If you have questions, call:  Endoscopy: Monday to Friday, 7 a.m. to 4:30 p.m. [phone] (We may have to call you back)  After hours: Hospital  [phone] (Ask for the GI fellow on call)        Pending Results     No orders found from 4/1/2018 to 4/4/2018.            Admission Information     Date & Time Provider Department Dept. Phone    4/3/2018 Guru Neil Jernigan MD [facility], [place], Endoscopy [phone]      Your Vitals Were     Blood Pressure Pulse Temperature Respirations Pulse Oximetry       99/62 69 97.7  F (36.5  C) (Oral) 11 95%       MyChart Information     [facility] gives you secure access to your electronic health record. If you see a primary care provider, you can also send messages to your care team and make appointments. If you have questions, please call your primary care clinic.  If you do not have a primary care provider, please call [phone] and they will assist you.        Care EveryWhere ID     This is your Care EveryWhere ID. This could be used by other organizations to access your [place] medical records  [id number]        Equal Access to Services     [facility] : Hadii steph nguyen Somitchel, waaxda luqadaha, qaybta kaalmasilvia cantrell. So [facility] [phone].    ATENCIÓN: Si habla español, tiene a gill disposición servicios gratuitos de asistencia lingüística. Llame al [phone].    We comply with applicable federal civil rights laws and [place] laws. We do not discriminate on the basis of race,  color, national origin, age, disability, sex, sexual orientation, or gender identity.               Review of your medicines      UNREVIEWED medicines. Ask your doctor about these medicines        Dose / Directions    budesonide 0.6 mg/mL Susp   Commonly known as:  ENTOCORT   Used for:  Eosinophilic esophagitis        Dose:  1 mg   Take 1.67 mLs (1 mg) by mouth 2 times daily   Quantity:  120 mL   Refills:  3                Protect others around you: Learn how to safely use, store and throw away your medicines at www.disposemymeds.org.             Medication List: This is a list of all your medications and when to take them. Check marks below indicate your daily home schedule. Keep this list as a reference.      Medications           Morning Afternoon Evening Bedtime As Needed    budesonide 0.6 mg/mL Susp   Commonly known as:  ENTOCORT   Take 1.67 mLs (1 mg) by mouth 2 times daily

## 2018-04-03 NOTE — DISCHARGE INSTRUCTIONS
[facility] Endoscopic Ultrasound with Monitored Anesthesia Care  For 24 hours after your procedure  Sedation:  1. Get plenty of rest. A responsible adult must stay with you for at least 24 hours after you leave the hospital.   2. Do not drive or use heavy equipment. If you have weakness or tingling, don't drive or use heavy equipment until this feeling goes away.  3. Do not drink alcohol.  4. Avoid strenuous or risky activities. Ask for help when climbing stairs.   5. You may feel lightheaded. IF so, sit for a few minutes before standing. Have someone help you get up.   6. If you have nausea (feel sick to your stomach): Drink only clear liquids such as apple juice, ginger ale, broth or 7-Up. Rest may also help. Be sure to drink enough fluids. Move to a regular diet as you feel able.  7. You may have a slight fever. Call the doctor if your fever is over 100 F (37.7 C) (taken under the tongue) or lasts longer than 24 hours.  8. You may have a dry mouth, a sore throat, muscle aches or trouble sleeping. These should go away after 24 hours.  9. Do not make important or legal decisions.   Procedural:  1. Wait one hour before eating or drinking. Start with sips of water. When your gag reflex has returned, you may return to your normal diet, medicines, and light exercise.  2. Some bloating is normal. You may have large burps or pass air.  3. You may have a sore throat for 2 to 3 days. If so, it may help to:    Avoid hot liquids for 24 hours.    Use sore throat lozenges.    Gargle as need with salt water up to 4 times a day. Mix 1 cup of warm water with 1 teaspoon of salt. Do not swallow.  4. You may take Tylenol (acetaminophen) for pain unless your doctor has told you not to.   Do not take aspirin or ibuprofen (Advil, Motrin, or other anti-inflammatory  drugs) for __3___ days.  Call right away if you have:  1. Unusual pain in belly or chest pain not relieved with passing air.  2. Severe throat pain or trouble  swallowing.  3. Black stools (tar-like looking bowel movement).  4. It has been over 8 to 10 hours since surgery and you are still not able to urinate (pass water).  5. Headache for over 24 hours.  6.   Follow-up:  __x__ We took small tissue or fluid samples. Your doctor will call you with the results within two weeks.  If you have severe pain, bleeding, vomit blood, or shortness of breath, go to an emergency room.  If you have questions, call:  Endoscopy: Monday to Friday, 7 a.m. to 4:30 p.m. [phone] (We may have to call you back)  After hours: Hospital  [phone] (Ask for the GI fellow on call)

## 2018-04-03 NOTE — TELEPHONE ENCOUNTER
Health Call Center    Phone Message    May a detailed message be left on voicemail: yes    Reason for Call: Other: called patient to schedule an appointment with Emmy Atkinson RD in Gastroenterology to discuss elimination diet for established EoE per referral from Dr. Guru Jernigan. Patient is wondering if Dr. Vincent would be willing to send the referral to a facility closer to home. Patient would like to discuss with clinic nurse.     Action Taken: Message routed to:  [facility]

## 2018-04-04 LAB — COPATH REPORT: NORMAL

## 2018-04-05 ENCOUNTER — HOSPITAL ENCOUNTER (OUTPATIENT)
Dept: PET IMAGING | Facility: CLINIC | Age: 28
Setting detail: NUCLEAR MEDICINE
Discharge: HOME | End: 2018-04-05
Attending: INTERNAL MEDICINE | Admitting: INTERNAL MEDICINE
Payer: COMMERCIAL

## 2018-04-05 ENCOUNTER — CARE COORDINATION (OUTPATIENT)
Dept: GASTROENTEROLOGY | Facility: CLINIC | Age: 28
End: 2018-04-05

## 2018-04-05 DIAGNOSIS — K31.89 MASS OF DUODENUM: ICD-10-CM

## 2018-04-05 PROCEDURE — A9587 GALLIUM GA-68: HCPCS | Performed by: INTERNAL MEDICINE

## 2018-04-05 PROCEDURE — 34300033 ZZH RX 343: Performed by: INTERNAL MEDICINE

## 2018-04-05 PROCEDURE — 78816 PET IMAGE W/CT FULL BODY: CPT | Mod: PI

## 2018-04-05 PROCEDURE — 25000128 H RX IP 250 OP 636: Performed by: INTERNAL MEDICINE

## 2018-04-05 RX ORDER — 68GA-DOTATATE 40 MCG
1-5.4 KIT INTRAVENOUS ONCE
Status: COMPLETED | OUTPATIENT
Start: 2018-04-05 | End: 2018-04-05

## 2018-04-05 RX ORDER — IOPAMIDOL 755 MG/ML
10-135 INJECTION, SOLUTION INTRAVASCULAR ONCE
Status: COMPLETED | OUTPATIENT
Start: 2018-04-05 | End: 2018-04-05

## 2018-04-05 RX ADMIN — 68GA-DOTATATE 3.48 MCI.: KIT INTRAVENOUS at 08:38

## 2018-04-05 RX ADMIN — IOPAMIDOL 96 ML: 755 INJECTION, SOLUTION INTRAVENOUS at 09:44

## 2018-04-05 NOTE — TELEPHONE ENCOUNTER
Called Gordo - message left for him that nutrition referral can be sent to his PCP MD locally. Will need a fax number and a doctor's name to sent this to.     - we do not have any contact for dietitians in his area.     Asked him to call back with fax number and MD name to send referral to.     Natalya ACOSTA RN Coordinator  Dr. Le, Dr. Cruz & Dr. Jernigan   Advanced Endoscopy  [phone]

## 2018-04-05 NOTE — PROGRESS NOTES
Post Upper Endo (4/3/2018) with Dr. Jernigan: Follow-up    Post procedure recommendations: Will recommend to taper to budesonide once daily for 4 weeks and stop. To continue PPI. Clinic visit with me in 3-4 months  - Await pathology results. If eosinophils still persist, will recommend to continue PPI and budesonide at the same dosage. Will also recommend to start elimination diet and will consult nutrition for the same. If there is a clear improvement of Eosinophils will recommend to continue omeprazole 20 mg BID and reduce dosage of Budesonide oral suspension to once daily.   - Will also recommend to follow up with DOTATATE scan as scheduled on 4/5                                      Patient states: Patient states he is doing well. Denies pain, fever or chills, nausea or emesis. Denies jaundice or skin abnormalities. Patient is tolerating regular diet.     Orders placed: None    Contact information verified for future questions/concerns.    Mary Jo BENAVIDES RN Coordinator  Dr. Jernigan  Advanced Endoscopy  [phone]

## 2018-04-09 ENCOUNTER — CARE COORDINATION (OUTPATIENT)
Dept: GASTROENTEROLOGY | Facility: CLINIC | Age: 28
End: 2018-04-09

## 2018-04-09 NOTE — PROGRESS NOTES
Message left for Gordo to call back with any questions about the message from Dr. Jernigan.   It appears that your eosinophils in the esophagus has completely resolved. Will recommend to cut down the budesonide to once daily for a month and stop. Will recommend a clinic follow up in 3 months   Will also recommend to continue PPI     Contact information left for him.     Natalya ACOSTA RN Coordinator  Dr. Le, Dr. Cruz & Dr. Jernigan   Advanced Endoscopy  [phone]

## 2018-04-11 ENCOUNTER — CARE COORDINATION (OUTPATIENT)
Dept: GASTROENTEROLOGY | Facility: CLINIC | Age: 28
End: 2018-04-11

## 2018-04-11 NOTE — PROGRESS NOTES
Gordo called back with questions. States he got the message about the budesonide.   But he wanted to let Dr. Jernigan know that he feels like his symptoms are worse. Lots of nausea and vomiting x 1 day. Not associated with food. Continues to have cramps in his esophagus and pain.     Advised will pass this message on to Dr. Swift and get back to him.     Natalya ACOSTA RN Coordinator  Dr. Le, Dr. Cruz & Dr. Jernigan   Advanced Endoscopy  [phone]

## 2018-04-17 ENCOUNTER — CARE COORDINATION (OUTPATIENT)
Dept: SURGERY | Facility: CLINIC | Age: 28
End: 2018-04-17

## 2018-04-17 NOTE — PROGRESS NOTES
This RNCC called to check on the patient's nausea and vomiting after he talked with Dr. Jernigan on Thursday 4/12/20108. The direct phone number for the patient to reach this RNCC was left in the voicemail asking for a call back at the patient's earliest convenience.

## 2018-04-20 ENCOUNTER — CARE COORDINATION (OUTPATIENT)
Dept: GASTROENTEROLOGY | Facility: CLINIC | Age: 28
End: 2018-04-20

## 2018-04-20 NOTE — PROGRESS NOTES
This RNCC called to check in on patient's symptoms again to make sure they are resolving. Patient states he has not had any issues since last week discussing his symptoms with Dr. Jernigan and this RNCC. Patient denies nausea, epigastric pain, or esophageal pain. patient states his symptoms have been cyclical and come and go for six months at a time. Patient states he is symptoms free at this time. Patient was advised to call this RNCC if he states to have these symptoms again. Patient stated understanding of this plan.

## 2018-05-08 ENCOUNTER — TELEPHONE (OUTPATIENT)
Dept: GASTROENTEROLOGY | Facility: CLINIC | Age: 28
End: 2018-05-08

## 2018-05-08 DIAGNOSIS — K20.0 EOSINOPHILIC ESOPHAGITIS: Primary | ICD-10-CM

## 2018-05-08 NOTE — TELEPHONE ENCOUNTER
Health Call Center    Phone Message    May a detailed message be left on voicemail: yes    Reason for Call: Other: Patient is wondering if he is able to have his follow-up appointment over the phone. Patient lives about 7 hours away and would prefer not to travel for a 30 min appointment.     Action Taken: Message routed to:  [facility]

## 2018-05-09 NOTE — TELEPHONE ENCOUNTER
This RNCC called the patient to inform him that Dr. Jernigan would be calling him for his follow up appointment instead of making a clinic visit. Patient states he has stomach cramps like he would get before he got sick. The patient states he had these cramps prior to his previous episodes of EoE. Patient denies any nausea or vomiting or epigastric reflux. Patient was encouraged to call this RNCC back with any questions or concerns or if his symptoms intensified. patient stated understanding of this plan of care. Patient's questions and concerns were addressed to his stated satisfaction.

## 2018-05-11 ENCOUNTER — TELEPHONE (OUTPATIENT)
Dept: CALL CENTER | Age: 28
End: 2018-05-11

## 2018-05-11 NOTE — TELEPHONE ENCOUNTER
[facility]: Nurse Triage Note  SITUATION/BACKGROUND                                                      Gordo Bearden is a 27 year old male who calls with nausea/vommiting and stomach pain.    Description:  nausea/vommiting and stomach pain  Onset/duration:  Wednesday - worsening eachday with it peaking today  Precip. factors:  None identified  Associated symptoms:  NA  Improves/worsens symptoms:  States that the symptoms just spontaneously go away after about 4 days.  Pain scale (0-10)   6-7/10 in the esophogeal area - does not take anything for the pain    MEDICATIONS:   Taking medication(s) as prescribed? Yes  omeprazole  Taking over the counter medication(s?) N/A  Any barriers to taking medication(s) as prescribed?  No  Medication(s) improving/managing symptoms?  N/A    Allergies:   Allergies   Allergen Reactions     Bees        ASSESSMENT      Patient states he is calling because he was instructed to let clinic RN Natalya know when he has symptoms again. He is not expecting a call back. I states that he knows at what point he would need to go to the ER if his symptoms were to worsen.    RECOMMENDATION/PLAN                                                      RECOMMENDED DISPOSITION:  Note taken and message sent to clinic RN.  Will comply with recommendation: Yes    If further questions/concerns or if symptoms do not improve, worsen or new symptoms develop, call your PCP or [phone] to talk with the Resident on call, as soon as possible.    Guideline used: Abdominal Pain  Pg 11  Telephone Triage Protocols for Nurses, Fifth Edition, Julie Briggs Kathleen M. Doege, AAKASH

## 2018-12-20 ENCOUNTER — CARE COORDINATION (OUTPATIENT)
Dept: GASTROENTEROLOGY | Facility: CLINIC | Age: 28
End: 2018-12-20

## 2018-12-20 DIAGNOSIS — K20.0 EOSINOPHILIC ESOPHAGITIS: ICD-10-CM

## 2018-12-20 NOTE — PROGRESS NOTES
Dr. Jernigan spoke with patient and patient reports nausea and vomiting that occurs every 3-4 months, lasting 1 week.      Per Dr. Swift:   I spoke to him     Please prescribe omeprazole 20 mg BID (30 mins before food)     No need for clinic follow up now     He will call you if he has recurrent symptoms and we will arrange repeat EGD then      Put in the prescription for omeprazole and sent to preferred pharmacy. He verbalized understanding. Gave clinic number for further questions/concerns.     SH[address]LEY [address] Dr. Cruz, & Dr. Jernigan  Advanced Endoscopy  [phone]

## 2019-03-25 ENCOUNTER — TELEPHONE (OUTPATIENT)
Dept: GASTROENTEROLOGY | Facility: CLINIC | Age: 29
End: 2019-03-25

## 2019-03-25 NOTE — TELEPHONE ENCOUNTER
Left message and advised that I would like to further discuss his nausea and vomiting that he has had for the past 3-4 days.     Clinic number given to call back.     SH[address] Dr. Cruz, & Dr. Jernigan  Advanced Endoscopy  [phone]

## 2019-03-25 NOTE — TELEPHONE ENCOUNTER
[facility] Call Center    Phone Message    May a detailed message be left on voicemail: yes    Reason for Call: Symptoms or Concerns     If patient has red-flag symptoms, warm transfer to triage line    Current symptom or concern: Nausea and vomiting    Symptoms have been present for:  3-4 days     Has patient previously been seen for this? Yes    By : Dr. Vincent    Date: 4/3/18    Are there any new or worsening symptoms? No      Action Taken: Message routed to:  [facility] [place]

## 2020-03-11 ENCOUNTER — HEALTH MAINTENANCE LETTER (OUTPATIENT)
Age: 30
End: 2020-03-11

## 2020-12-27 ENCOUNTER — HEALTH MAINTENANCE LETTER (OUTPATIENT)
Age: 30
End: 2020-12-27

## 2021-04-25 ENCOUNTER — HEALTH MAINTENANCE LETTER (OUTPATIENT)
Age: 31
End: 2021-04-25

## 2021-10-09 ENCOUNTER — HEALTH MAINTENANCE LETTER (OUTPATIENT)
Age: 31
End: 2021-10-09

## 2022-05-21 ENCOUNTER — HEALTH MAINTENANCE LETTER (OUTPATIENT)
Age: 32
End: 2022-05-21

## 2022-09-17 ENCOUNTER — HEALTH MAINTENANCE LETTER (OUTPATIENT)
Age: 32
End: 2022-09-17

## 2023-06-04 ENCOUNTER — HEALTH MAINTENANCE LETTER (OUTPATIENT)
Age: 33
End: 2023-06-04

## 2024-08-26 ENCOUNTER — HOSPITAL ENCOUNTER (OUTPATIENT)
Dept: RADIOLOGY | Facility: CLINIC | Age: 34
Discharge: HOME | End: 2024-08-26
Payer: COMMERCIAL

## 2024-08-26 DIAGNOSIS — E78.5 HYPERLIPIDEMIA, UNSPECIFIED: ICD-10-CM

## 2024-08-26 DIAGNOSIS — R73.9 HYPERGLYCEMIA, UNSPECIFIED: ICD-10-CM

## 2024-08-26 PROCEDURE — 75571 CT HRT W/O DYE W/CA TEST: CPT

## 2024-08-28 DIAGNOSIS — R91.1 LUNG NODULE: Primary | ICD-10-CM

## 2024-09-06 ENCOUNTER — TELEPHONE (OUTPATIENT)
Dept: PRIMARY CARE | Facility: CLINIC | Age: 34
End: 2024-09-06
Payer: COMMERCIAL

## 2024-09-06 NOTE — TELEPHONE ENCOUNTER
Educational materials mailed per request, and patient is going to meet with PCP 11/2024 to discuss incidental finding.  Referral to [facility] will be deferred until 12/2024 per request.

## (undated) RX ORDER — LIDOCAINE HYDROCHLORIDE 20 MG/ML
INJECTION, SOLUTION EPIDURAL; INFILTRATION; INTRACAUDAL; PERINEURAL
Status: DISPENSED
Start: 2018-04-03

## (undated) RX ORDER — FENTANYL CITRATE 50 UG/ML
INJECTION, SOLUTION INTRAMUSCULAR; INTRAVENOUS
Status: DISPENSED
Start: 2018-04-03

## (undated) RX ORDER — PROPOFOL 10 MG/ML
INJECTION, EMULSION INTRAVENOUS
Status: DISPENSED
Start: 2018-04-03

## (undated) RX ORDER — LEVOFLOXACIN 5 MG/ML
INJECTION, SOLUTION INTRAVENOUS
Status: DISPENSED
Start: 2017-12-12

## (undated) RX ORDER — SIMETHICONE 20 MG/.3ML
EMULSION ORAL
Status: DISPENSED
Start: 2018-04-03

## (undated) RX ORDER — SIMETHICONE 20 MG/.3ML
EMULSION ORAL
Status: DISPENSED
Start: 2017-12-12

## (undated) RX ORDER — GLYCOPYRROLATE 0.2 MG/ML
INJECTION, SOLUTION INTRAMUSCULAR; INTRAVENOUS
Status: DISPENSED
Start: 2018-04-03